# Patient Record
Sex: MALE | Race: BLACK OR AFRICAN AMERICAN | Employment: UNEMPLOYED | RURAL
[De-identification: names, ages, dates, MRNs, and addresses within clinical notes are randomized per-mention and may not be internally consistent; named-entity substitution may affect disease eponyms.]

---

## 2017-11-28 ENCOUNTER — OFFICE VISIT (OUTPATIENT)
Dept: FAMILY MEDICINE CLINIC | Age: 52
End: 2017-11-28

## 2017-11-28 ENCOUNTER — DOCUMENTATION ONLY (OUTPATIENT)
Dept: FAMILY MEDICINE CLINIC | Age: 52
End: 2017-11-28

## 2017-11-28 VITALS
RESPIRATION RATE: 18 BRPM | BODY MASS INDEX: 27.59 KG/M2 | HEIGHT: 73 IN | OXYGEN SATURATION: 99 % | SYSTOLIC BLOOD PRESSURE: 147 MMHG | WEIGHT: 208.2 LBS | TEMPERATURE: 98.3 F | HEART RATE: 84 BPM | DIASTOLIC BLOOD PRESSURE: 97 MMHG

## 2017-11-28 DIAGNOSIS — I10 ESSENTIAL HYPERTENSION: Primary | ICD-10-CM

## 2017-11-28 DIAGNOSIS — E55.9 HYPOVITAMINOSIS D: ICD-10-CM

## 2017-11-28 DIAGNOSIS — N52.02 CORPORO-VENOUS OCCLUSIVE ERECTILE DYSFUNCTION: ICD-10-CM

## 2017-11-28 DIAGNOSIS — G47.33 OBSTRUCTIVE SLEEP APNEA SYNDROME: ICD-10-CM

## 2017-11-28 DIAGNOSIS — M75.01 ADHESIVE CAPSULITIS OF RIGHT SHOULDER: ICD-10-CM

## 2017-11-28 DIAGNOSIS — S46.011A STRAIN OF ROTATOR CUFF, RIGHT, INITIAL ENCOUNTER: ICD-10-CM

## 2017-11-28 DIAGNOSIS — F43.10 PTSD (POST-TRAUMATIC STRESS DISORDER): ICD-10-CM

## 2017-11-28 DIAGNOSIS — J30.89 CHRONIC NONSEASONAL ALLERGIC RHINITIS DUE TO FUNGAL SPORES: ICD-10-CM

## 2017-11-28 DIAGNOSIS — E11.42 CONTROLLED TYPE 2 DIABETES MELLITUS WITH DIABETIC POLYNEUROPATHY, WITHOUT LONG-TERM CURRENT USE OF INSULIN (HCC): ICD-10-CM

## 2017-11-28 RX ORDER — HYDROCHLOROTHIAZIDE 25 MG/1
25 TABLET ORAL DAILY
Qty: 90 TAB | Refills: 1 | Status: SHIPPED | OUTPATIENT
Start: 2017-11-28 | End: 2018-02-28 | Stop reason: SDUPTHER

## 2017-11-28 RX ORDER — FLUTICASONE PROPIONATE 50 MCG
SPRAY, SUSPENSION (ML) NASAL
Qty: 1 BOTTLE | Refills: 12 | Status: SHIPPED | OUTPATIENT
Start: 2017-11-28 | End: 2021-01-27

## 2017-11-28 RX ORDER — IPRATROPIUM BROMIDE 42 UG/1
1 SPRAY, METERED NASAL 4 TIMES DAILY
Qty: 15 ML | Refills: 0 | Status: SHIPPED | OUTPATIENT
Start: 2017-11-28 | End: 2019-02-13

## 2017-11-28 NOTE — MR AVS SNAPSHOT
Visit Information Date & Time Provider Department Dept. Phone Encounter #  
 11/28/2017  7:00 AM MD [facility] [phone] [id number] Your Appointments 2/28/2018  7:00 AM  
Follow Up with MD [facility]) Appt Note: 3 month f/u  
 [address] [facility] [address] [address] [phone]  
  
   
 [address] [place] [id number] Upcoming Health Maintenance Date Due Hepatitis C Screening 1965 COLONOSCOPY 11/30/2025 DTaP/Tdap/Td series (2 - Td) 11/28/2027 Allergies as of 11/28/2017  Review Complete On: 11/28/2017 By: Aisha Clifford MD  
 No Known Allergies Current Immunizations  Never Reviewed No immunizations on file. Not reviewed this visit You Were Diagnosed With   
  
 Codes Comments Essential hypertension    -  Primary ICD-10-CM: I10 
ICD-9-CM: 401.9 Corporo-venous occlusive erectile dysfunction     ICD-10-CM: N52.02 
ICD-9-CM: 607.84 Hypovitaminosis D     ICD-10-CM: E55.9 ICD-9-CM: 268.9 Obstructive sleep apnea syndrome     ICD-10-CM: G47.33 
ICD-9-CM: 327.23 Controlled type 2 diabetes mellitus with diabetic polyneuropathy, without long-term current use of insulin (HCC)     ICD-10-CM: E11.42 
ICD-9-CM: 250.60, 357.2 Strain of rotator cuff, right, initial encounter     ICD-10-CM: O78.751C ICD-9-CM: 840.4 Adhesive capsulitis of right shoulder     ICD-10-CM: M75.01 
ICD-9-CM: 726.0 PTSD (post-traumatic stress disorder)     ICD-10-CM: F43.10 ICD-9-CM: 309.81 Chronic nonseasonal allergic rhinitis due to fungal spores     ICD-10-CM: J30.89 ICD-9-CM: 477.8 Vitals BP Pulse Temp Resp Height(growth percentile) Weight(growth percentile) (!) 147/97 (BP 1 Location: Left arm, BP Patient Position: Sitting) 84 98.3 °F (36.8 °C) (Oral) 18 6' 1\" (1.854 m) 208 lb 3.2 oz (94.4 kg) SpO2 BMI Smoking Status 99% 27.47 kg/m2 Current Every Day Smoker Vitals History BMI and BSA Data Body Mass Index Body Surface Area  
 27.47 kg/m 2 2.21 m 2 Preferred Pharmacy Pharmacy Name Phone [address], [place] Cast[place] [phone] Your Updated Medication List  
  
   
This list is accurate as of: 11/28/17  8:09 AM.  Always use your most recent med list.  
  
  
  
  
 capsaicin 0.075 % topical cream  
Apply  to affected area three (3) times daily. diclofenac EC 50 mg EC tablet Commonly known as:  VOLTAREN Take  by mouth two (2) times a day. ferrous sulfate 325 mg (65 mg iron) tablet Take  by mouth Daily (before breakfast). fluticasone 50 mcg/actuation nasal spray Commonly known as:  FLONASE  
1 spray each nostril twice daily  Indications: CHRONIC NON-ALLERGIC RHINITIS  
  
 gabapentin 600 mg tablet Commonly known as:  NEURONTIN Take 1 Tab by mouth three (3) times daily. hydroCHLOROthiazide 25 mg tablet Commonly known as:  HYDRODIURIL Take 1 Tab by mouth daily. ipratropium 0.06 % nasal spray Commonly known as:  ATROVENT  
1 [place] by Both Nostrils route four (4) times daily. Indications: Rhinorrhea  
  
 lancets 23 gauge Misc  
by Does Not Apply route. metFORMIN 500 mg tablet Commonly known as:  GLUCOPHAGE Take 2 Tabs by mouth two (2) times daily (with meals). methyl salicylate-menthol 00-54 % topical cream  
Commonly known as:  [place] Apply  to affected area three (3) times daily. omeprazole 20 mg capsule Commonly known as:  PRILOSEC Take 20 mg by mouth daily. ondansetron 4 mg disintegrating tablet Commonly known as:  ZOFRAN ODT Take 1 Tab by mouth every eight (8) hours as needed for Nausea for up to 12 doses. prazosin 2 mg capsule Commonly known as:  MINIPRESS Take 2 mg by mouth nightly. promethazine 25 mg tablet Commonly known as:  PHENERGAN Take 1 Tab by mouth every six (6) hours as needed. sertraline 50 mg tablet Commonly known as:  ZOLOFT Take 1 Tab by mouth daily. VITAMIN D2 50,000 unit capsule Generic drug:  ergocalciferol Take 50,000 Units by mouth every Monday. Prescriptions Printed Refills  
 hydroCHLOROthiazide (HYDRODIURIL) 25 mg tablet 1 Sig: Take 1 Tab by mouth daily. Class: Print Route: Oral  
 fluticasone (FLONASE) 50 mcg/actuation nasal spray 12 Si spray each nostril twice daily  Indications: CHRONIC NON-ALLERGIC RHINITIS Class: Print  
 ipratropium (ATROVENT) 0.06 % nasal spray 0 [place] by Both Nostrils route four (4) times daily. Indications: Rhinorrhea Class: Print Route: Both Nostrils We Performed the Following CBC WITH AUTOMATED DIFF [88348 CPT(R)] HEMOGLOBIN A1C WITH EAG [51570 CPT(R)] LIPID PANEL [43327 CPT(R)] METABOLIC PANEL, COMPREHENSIVE [65486 CPT(R)] MICROALBUMIN, UR, RAND W/ MICROALBUMIN/CREA RATIO B5110220 CPT(R)] PROLACTIN [53575 CPT(R)] REFERRAL TO PHYSICAL THERAPY [XYB42 Custom] TESTOSTERONE, FREE & TOTAL [39147 CPT(R)] VITAMIN D, 1, 25 DIHYDROXY [14337 CPT(R)] Referral Information Referral ID Referred By Referred To  
  
 [id number] [address] [place] [address] [address] [facility], [facility] Phone: [phone] Visits Status Start Date End Date 1 New Request 17 If your referral has a status of pending review or denied, additional information will be sent to support the outcome of this decision. Introducing [facility]! Malena Guerrero introduces [facility] patient portal. Now you can access parts of your medical record, email your doctor's office, and request medication refills online. 1. In your internet browser, go to [email or web address] 2. Click on the First Time User? Click Here link in the Sign In box. You will see the New Member Sign Up page. 3. Enter your [employer] Access Code exactly as it appears below. You will not need to use this code after youve completed the sign-up process. If you do not sign up before the expiration date, you must request a new code. · [employer] Access Code: [email or web address] 
Expires: 12/26/2017  8:45 AM 
 
4. Enter the last four digits of your Social Security Number (xxxx) and Date of Birth (mm/dd/yyyy) as indicated and click Submit. You will be taken to the next sign-up page. 5. Create a [employer] ID. This will be your [employer] login ID and cannot be changed, so think of one that is secure and easy to remember. 6. Create a [employer] password. You can change your password at any time. 7. Enter your Password Reset Question and Answer. This can be used at a later time if you forget your password. 8. Enter your e-mail address. You will receive e-mail notification when new information is available in [address] 9. Click Sign Up. You can now view and download portions of your medical record. 10. Click the Download Summary menu link to download a portable copy of your medical information. If you have questions, please visit the Frequently Asked Questions section of the [employer] website. Remember, [employer] is NOT to be used for urgent needs. For medical emergencies, dial 911. Now available from your iPhone and Android! Please provide this summary of care documentation to your next provider. Your primary care clinician is listed as Carol Wolfe. If you have any questions after today's visit, please call [phone].

## 2017-11-29 LAB
1,25(OH)2D3 SERPL-MCNC: 36.5 PG/ML (ref 19.9–79.3)
ALBUMIN SERPL-MCNC: 4.1 G/DL (ref 3.5–5.5)
ALBUMIN/CREAT UR: 4.4 MG/G CREAT (ref 0–30)
ALBUMIN/GLOB SERPL: 1.6 {RATIO} (ref 1.2–2.2)
ALP SERPL-CCNC: 61 IU/L (ref 39–117)
ALT SERPL-CCNC: 17 IU/L (ref 0–44)
AST SERPL-CCNC: 14 IU/L (ref 0–40)
BASOPHILS # BLD AUTO: 0 X10E3/UL (ref 0–0.2)
BASOPHILS NFR BLD AUTO: 0 %
BILIRUB SERPL-MCNC: 0.9 MG/DL (ref 0–1.2)
BUN SERPL-MCNC: 13 MG/DL (ref 6–24)
BUN/CREAT SERPL: 15 (ref 9–20)
CALCIUM SERPL-MCNC: 9.5 MG/DL (ref 8.7–10.2)
CHLORIDE SERPL-SCNC: 100 MMOL/L (ref 96–106)
CHOLEST SERPL-MCNC: 245 MG/DL (ref 100–199)
CO2 SERPL-SCNC: 25 MMOL/L (ref 18–29)
CREAT SERPL-MCNC: 0.84 MG/DL (ref 0.76–1.27)
CREAT UR-MCNC: 104.2 MG/DL
EOSINOPHIL # BLD AUTO: 0.2 X10E3/UL (ref 0–0.4)
EOSINOPHIL NFR BLD AUTO: 3 %
ERYTHROCYTE [DISTWIDTH] IN BLOOD BY AUTOMATED COUNT: 13.5 % (ref 12.3–15.4)
EST. AVERAGE GLUCOSE BLD GHB EST-MCNC: 126 MG/DL
GFR SERPLBLD CREATININE-BSD FMLA CKD-EPI: 101 ML/MIN/1.73
GFR SERPLBLD CREATININE-BSD FMLA CKD-EPI: 116 ML/MIN/1.73
GLOBULIN SER CALC-MCNC: 2.5 G/DL (ref 1.5–4.5)
GLUCOSE SERPL-MCNC: 156 MG/DL (ref 65–99)
HBA1C MFR BLD: 6 % (ref 4.8–5.6)
HCT VFR BLD AUTO: 35.7 % (ref 37.5–51)
HDLC SERPL-MCNC: 66 MG/DL
HGB BLD-MCNC: 11.8 G/DL (ref 12.6–17.7)
IMM GRANULOCYTES # BLD: 0 X10E3/UL (ref 0–0.1)
IMM GRANULOCYTES NFR BLD: 0 %
INTERPRETATION, 910389: NORMAL
LDLC SERPL CALC-MCNC: 157 MG/DL (ref 0–99)
LYMPHOCYTES # BLD AUTO: 2.6 X10E3/UL (ref 0.7–3.1)
LYMPHOCYTES NFR BLD AUTO: 46 %
MCH RBC QN AUTO: 31.7 PG (ref 26.6–33)
MCHC RBC AUTO-ENTMCNC: 33.1 G/DL (ref 31.5–35.7)
MCV RBC AUTO: 96 FL (ref 79–97)
MICROALBUMIN UR-MCNC: 4.6 UG/ML
MONOCYTES # BLD AUTO: 0.5 X10E3/UL (ref 0.1–0.9)
MONOCYTES NFR BLD AUTO: 8 %
NEUTROPHILS # BLD AUTO: 2.5 X10E3/UL (ref 1.4–7)
NEUTROPHILS NFR BLD AUTO: 43 %
PLATELET # BLD AUTO: 201 X10E3/UL (ref 150–379)
POTASSIUM SERPL-SCNC: 4.2 MMOL/L (ref 3.5–5.2)
PROLACTIN SERPL-MCNC: 10.5 NG/ML (ref 4–15.2)
PROT SERPL-MCNC: 6.6 G/DL (ref 6–8.5)
RBC # BLD AUTO: 3.72 X10E6/UL (ref 4.14–5.8)
SODIUM SERPL-SCNC: 140 MMOL/L (ref 134–144)
TESTOST FREE SERPL-MCNC: 9.2 PG/ML (ref 7.2–24)
TESTOST SERPL-MCNC: 406 NG/DL (ref 264–916)
TRIGL SERPL-MCNC: 108 MG/DL (ref 0–149)
VLDLC SERPL CALC-MCNC: 22 MG/DL (ref 5–40)
WBC # BLD AUTO: 5.8 X10E3/UL (ref 3.4–10.8)

## 2017-12-01 RX ORDER — ATORVASTATIN CALCIUM 40 MG/1
40 TABLET, FILM COATED ORAL DAILY
Qty: 90 TAB | Refills: 1 | Status: SHIPPED | OUTPATIENT
Start: 2017-12-01 | End: 2021-01-27

## 2018-02-28 ENCOUNTER — DOCUMENTATION ONLY (OUTPATIENT)
Dept: FAMILY MEDICINE CLINIC | Age: 53
End: 2018-02-28

## 2018-02-28 ENCOUNTER — OFFICE VISIT (OUTPATIENT)
Dept: FAMILY MEDICINE CLINIC | Age: 53
End: 2018-02-28

## 2018-02-28 VITALS
HEIGHT: 73 IN | BODY MASS INDEX: 27.51 KG/M2 | RESPIRATION RATE: 18 BRPM | DIASTOLIC BLOOD PRESSURE: 88 MMHG | SYSTOLIC BLOOD PRESSURE: 133 MMHG | OXYGEN SATURATION: 98 % | WEIGHT: 207.6 LBS | HEART RATE: 75 BPM

## 2018-02-28 DIAGNOSIS — Z11.59 ENCOUNTER FOR HEPATITIS C VIRUS SCREENING TEST FOR HIGH RISK PATIENT: ICD-10-CM

## 2018-02-28 DIAGNOSIS — M25.50 ARTHRALGIA, UNSPECIFIED JOINT: ICD-10-CM

## 2018-02-28 DIAGNOSIS — K21.9 GASTROESOPHAGEAL REFLUX DISEASE, ESOPHAGITIS PRESENCE NOT SPECIFIED: ICD-10-CM

## 2018-02-28 DIAGNOSIS — I10 ESSENTIAL HYPERTENSION: ICD-10-CM

## 2018-02-28 DIAGNOSIS — E11.42 CONTROLLED TYPE 2 DIABETES MELLITUS WITH DIABETIC POLYNEUROPATHY, WITHOUT LONG-TERM CURRENT USE OF INSULIN (HCC): Primary | ICD-10-CM

## 2018-02-28 DIAGNOSIS — M75.01 ADHESIVE CAPSULITIS OF RIGHT SHOULDER: ICD-10-CM

## 2018-02-28 DIAGNOSIS — M17.0 PRIMARY OSTEOARTHRITIS OF BOTH KNEES: ICD-10-CM

## 2018-02-28 DIAGNOSIS — Z91.89 ENCOUNTER FOR HEPATITIS C VIRUS SCREENING TEST FOR HIGH RISK PATIENT: ICD-10-CM

## 2018-02-28 RX ORDER — NAPROXEN 500 MG/1
500 TABLET ORAL 2 TIMES DAILY WITH MEALS
Qty: 180 TAB | Refills: 1 | Status: SHIPPED | OUTPATIENT
Start: 2018-02-28 | End: 2018-02-28 | Stop reason: ALTCHOICE

## 2018-02-28 RX ORDER — HYDROCHLOROTHIAZIDE 25 MG/1
25 TABLET ORAL DAILY
Qty: 90 TAB | Refills: 1 | Status: SHIPPED | OUTPATIENT
Start: 2018-02-28 | End: 2021-01-27

## 2018-02-28 RX ORDER — OMEPRAZOLE 40 MG/1
40 CAPSULE, DELAYED RELEASE ORAL DAILY
Qty: 90 CAP | Refills: 1 | Status: SHIPPED | OUTPATIENT
Start: 2018-02-28 | End: 2018-02-28 | Stop reason: ALTCHOICE

## 2018-02-28 RX ORDER — OMEPRAZOLE 40 MG/1
40 CAPSULE, DELAYED RELEASE ORAL DAILY
Qty: 90 CAP | Refills: 1 | Status: SHIPPED | OUTPATIENT
Start: 2018-02-28 | End: 2021-01-27

## 2018-02-28 RX ORDER — MELOXICAM 15 MG/1
15 TABLET ORAL
COMMUNITY
Start: 2018-02-19 | End: 2018-02-28 | Stop reason: ALTCHOICE

## 2018-02-28 RX ORDER — MELOXICAM 15 MG/1
15 TABLET ORAL DAILY
Qty: 90 TAB | Refills: 1 | Status: CANCELLED | OUTPATIENT
Start: 2018-02-28 | End: 2018-04-29

## 2018-02-28 RX ORDER — NAPROXEN 500 MG/1
500 TABLET ORAL 2 TIMES DAILY WITH MEALS
Qty: 180 TAB | Refills: 1 | Status: SHIPPED | OUTPATIENT
Start: 2018-02-28 | End: 2018-03-07 | Stop reason: SDUPTHER

## 2018-02-28 RX ORDER — GABAPENTIN 600 MG/1
600 TABLET ORAL 3 TIMES DAILY
Qty: 270 TAB | Refills: 1 | Status: ON HOLD | OUTPATIENT
Start: 2018-02-28 | End: 2019-02-13 | Stop reason: SDUPTHER

## 2018-02-28 NOTE — PROGRESS NOTES
Rianna Diez is a 46 y.o. male who presents with the following:  Chief Complaint   Patient presents with    Hypertension    Diabetes    Cholesterol Problem       Hypertension    The history is provided by the patient (Patient is having no shortness of breath no chest pain or edema). Pertinent negatives include no chest pain, no blurred vision, no headaches, no dizziness, no shortness of breath, no nausea and no vomiting. Diabetes   The history is provided by the patient (Patient is having some burning pain in his feet which is being reduced by his gabapentin which he only takes twice a day. ). Pertinent negatives include no chest pain, no abdominal pain, no headaches and no shortness of breath. Cholesterol Problem   The history is provided by the patient (Patient is having no muscle pain or weakness from his atorvastatin but he did eat this morning. ). Pertinent negatives include no chest pain, no abdominal pain, no headaches and no shortness of breath. No Known Allergies    Current Outpatient Prescriptions   Medication Sig    gabapentin (NEURONTIN) 600 mg tablet Take 1 Tab by mouth three (3) times daily.  hydroCHLOROthiazide (HYDRODIURIL) 25 mg tablet Take 1 Tab by mouth daily.  naproxen (NAPROSYN) 500 mg tablet Take 1 Tab by mouth two (2) times daily (with meals).  omeprazole (PRILOSEC) 40 mg capsule Take 1 Cap by mouth daily.  polyethylene glycol (MIRALAX) 17 gram/dose powder Take 17 g by mouth daily.  atorvastatin (LIPITOR) 40 mg tablet Take 1 Tab by mouth daily.  fluticasone (FLONASE) 50 mcg/actuation nasal spray 1 spray each nostril twice daily  Indications: CHRONIC NON-ALLERGIC RHINITIS    ipratropium (ATROVENT) 0.06 % nasal spray 1 [place] by Both Nostrils route four (4) times daily. Indications: Rhinorrhea    promethazine (PHENERGAN) 25 mg tablet Take 1 Tab by mouth every six (6) hours as needed.     ondansetron (ZOFRAN ODT) 4 mg disintegrating tablet Take 1 Tab by mouth every eight (8) hours as needed for Nausea for up to 12 doses.  ergocalciferol (VITAMIN D2) 50,000 unit capsule Take 50,000 Units by mouth every Monday.  metFORMIN (GLUCOPHAGE) 500 mg tablet Take 2 Tabs by mouth two (2) times daily (with meals).  sertraline (ZOLOFT) 50 mg tablet Take 1 Tab by mouth daily.  capsaicin 0.075 % topical cream Apply  to affected area three (3) times daily.  lancets 23 gauge misc by Does Not Apply route.  methyl salicylate-menthol (BENGAY) 15-10 % topical cream Apply  to affected area three (3) times daily.  ferrous sulfate 325 mg (65 mg iron) tablet Take  by mouth Daily (before breakfast).  prazosin (MINIPRESS) 2 mg capsule Take 2 mg by mouth nightly. No current facility-administered medications for this visit. Past Medical History:   Diagnosis Date    Anxiety     Back problem     chronic LBP    CAD (coronary artery disease)     pt reports MI x2, hosp 2014, 2015    Depression     Diabetes ([facility] [address]     Glucose intolerance (impaired glucose tolerance)     Hypertension     Joint pain     diffuse    Sleeping difficulties     Weakness     LE>UE       Past Surgical History:   Procedure Laterality Date    HX HEART CATHETERIZATION  2012    [address] [facility]       No family history on file. Social History     Social History    Marital status:      Spouse name: N/A    Number of children: N/A    Years of education: N/A     Social History Main Topics    Smoking status: Former Smoker     Packs/day: 1.00    Smokeless tobacco: Never Used    Alcohol use 25.2 oz/week     42 Cans of beer per week      Comment: beer  6 pack/day ,whiskey 10 per week for 35 years    Drug use: Yes     Special: Marijuana      Comment: couple times week    Sexual activity: Yes     Other Topics Concern    None     Social History Narrative       Review of Systems   Constitutional: Negative for chills, fever and weight loss.    HENT: Negative for congestion and hearing loss. Eyes: Negative for blurred vision and double vision. Respiratory: Negative for shortness of breath and wheezing. Cardiovascular: Negative for chest pain. Gastrointestinal: Positive for heartburn. Negative for abdominal pain, diarrhea, nausea and vomiting. Genitourinary: Negative for dysuria, frequency and urgency. Musculoskeletal: Positive for joint pain. Negative for myalgias. Skin: Negative for itching and rash. Neurological: Positive for tingling (In the feet). Negative for dizziness, sensory change, speech change and headaches. Endo/Heme/Allergies: Negative for environmental allergies. Does not bruise/bleed easily. Psychiatric/Behavioral: Positive for substance abuse (Cocaine and marijuana). Negative for depression, hallucinations and memory loss. The patient is not nervous/anxious and does not have insomnia. Visit Vitals    /88 (BP 1 Location: Left arm, BP Patient Position: Sitting)    Pulse 75    Resp 18    Ht 6' 1\" (1.854 m)    Wt 207 lb 9.6 oz (94.2 kg)    SpO2 98%    BMI 27.39 kg/m2     Physical Exam   Constitutional: He is oriented to person, place, and time and well-developed, well-nourished, and in no distress. HENT:   Head: Normocephalic and atraumatic. Nose: Nose normal.   Mouth/Throat: Oropharynx is clear and moist.   Eyes: Conjunctivae and EOM are normal. Pupils are equal, round, and reactive to light. Neck: Normal range of motion. Neck supple. No JVD present. No tracheal deviation present. No thyromegaly present. Cardiovascular: Normal rate, regular rhythm, normal heart sounds and intact distal pulses. Exam reveals no gallop and no friction rub. No murmur heard. Pulmonary/Chest: Effort normal and breath sounds normal. No respiratory distress. He has no wheezes. He has no rales. He exhibits no tenderness. Abdominal: Soft. Bowel sounds are normal. He exhibits no distension and no mass. There is no tenderness.  There is no rebound and no guarding. Musculoskeletal: Normal range of motion. He exhibits no edema or tenderness. Lymphadenopathy:     He has no cervical adenopathy. Neurological: He is alert and oriented to person, place, and time. He has normal reflexes. No cranial nerve deficit. He exhibits normal muscle tone. Gait normal. Coordination normal.   The bottom of the patient's feet are numb although he can feels some monofilament on the dorsum of the feet   Skin: Skin is warm and dry. No rash noted. No erythema. Psychiatric: Mood, memory, affect and judgment normal.   Vitals reviewed. ICD-10-CM ICD-9-CM    1. Controlled type 2 diabetes mellitus with diabetic polyneuropathy, without long-term current use of insulin (HCC) E11.42 250.60 gabapentin (NEURONTIN) 600 mg tablet     357.2 HM DIABETES FOOT EXAM      COLLECTION VENOUS BLOOD,VENIPUNCTURE      MICROALBUMIN, UR, RAND      HEMOGLOBIN A1C WITH EAG      REFERRAL TO PODIATRY      REFERRAL TO OPHTHALMOLOGY   2. Essential hypertension I10 401.9 hydroCHLOROthiazide (HYDRODIURIL) 25 mg tablet      COLLECTION VENOUS BLOOD,VENIPUNCTURE      METABOLIC PANEL, COMPREHENSIVE      LIPID PANEL   3. Arthralgia, unspecified joint M25.50 719.40 gabapentin (NEURONTIN) 600 mg tablet      XR KNEE RT MAX 2 VWS      naproxen (NAPROSYN) 500 mg tablet      DISCONTINUED: naproxen (NAPROSYN) 500 mg tablet   4. Adhesive capsulitis of right shoulder M75.01 726.0 gabapentin (NEURONTIN) 600 mg tablet      naproxen (NAPROSYN) 500 mg tablet      DISCONTINUED: naproxen (NAPROSYN) 500 mg tablet   5. Gastroesophageal reflux disease, esophagitis presence not specified K21.9 530.81 omeprazole (PRILOSEC) 40 mg capsule      DISCONTINUED: omeprazole (PRILOSEC) 40 mg capsule   6. Primary osteoarthritis of both knees M17.0 715.16 XR KNEE RT MAX 2 VWS      naproxen (NAPROSYN) 500 mg tablet   7.  Encounter for hepatitis C virus screening test for high risk patient Z11.59 V73.89 HEPATITIS C AB    Z91.89 Orders Placed This Encounter    XR KNEE RT MAX 2 VWS     Standing Status:   Future     Standing Expiration Date:   3/28/2019     Order Specific Question:   Reason for Exam     Answer:   Bilateral knee pain    METABOLIC PANEL, COMPREHENSIVE    LIPID PANEL    MICROALBUMIN, UR, RAND    HEMOGLOBIN A1C WITH EAG    HEPATITIS C AB    REFERRAL TO PODIATRY     Referral Priority:   Routine     Referral Type:   Consultation     Referral Reason:   Specialty Services Required    REFERRAL TO OPHTHALMOLOGY     Referral Priority:   Routine     Referral Type:   Consultation     Referral Reason:   Specialty Services Required     DIABETES FOOT EXAM    COLLECTION VENOUS BLOOD,VENIPUNCTURE    gabapentin (NEURONTIN) 600 mg tablet     Sig: Take 1 Tab by mouth three (3) times daily. Dispense:  270 Tab     Refill:  1    hydroCHLOROthiazide (HYDRODIURIL) 25 mg tablet     Sig: Take 1 Tab by mouth daily. Dispense:  90 Tab     Refill:  1    DISCONTD: naproxen (NAPROSYN) 500 mg tablet     Sig: Take 1 Tab by mouth two (2) times daily (with meals). Dispense:  180 Tab     Refill:  1    DISCONTD: omeprazole (PRILOSEC) 40 mg capsule     Sig: Take 1 Cap by mouth daily. Indications: PREVENTION OF NSAID-INDUCED GASTRIC ULCER     Dispense:  90 Cap     Refill:  1    naproxen (NAPROSYN) 500 mg tablet     Sig: Take 1 Tab by mouth two (2) times daily (with meals). Dispense:  180 Tab     Refill:  1    omeprazole (PRILOSEC) 40 mg capsule     Sig: Take 1 Cap by mouth daily. Dispense:  90 Cap     Refill:  1   Diabetic foot exam was carried out demonstrating hammertoes and that he was numb under both feet tomorrow filament exam and he has fungus under all of his toes but his pulsations were good in the dorsalis pedis and posterior tibials bilaterally.   The patient was told to get Lamisil cream over-the-counter and use it daily on his toes and under the toes and to clean the debris out gently to avoid any injury.     Follow-up Disposition: Not on Nathan Euceda MD

## 2018-02-28 NOTE — MR AVS SNAPSHOT
[address] [phone] Patient: Elizabeth Carter MRN: [id number] :1965 Visit Information Date & Time Provider Department Dept. Phone Encounter #  
 2018  7:00 AM Corrinne Round., MD [address] [place] [address] [id number] Upcoming Health Maintenance Date Due  
 EYE EXAM RETINAL OR DILATED Q1 1975 HEMOGLOBIN A1C Q6M 2018 MICROALBUMIN Q1 2018 LIPID PANEL Q1 2018 FOOT EXAM Q1 2019 COLONOSCOPY 2025 DTaP/Tdap/Td series (2 - Td) 2027 Allergies as of 2018  Review Complete On: 2018 By: Corrinne Round., MD  
 No Known Allergies Current Immunizations  Never Reviewed No immunizations on file. Not reviewed this visit You Were Diagnosed With   
  
 Codes Comments Controlled type 2 diabetes mellitus with diabetic polyneuropathy, without long-term current use of insulin (HCC)    -  Primary ICD-10-CM: E11.42 
ICD-9-CM: 250.60, 357.2 Essential hypertension     ICD-10-CM: I10 
ICD-9-CM: 401.9 Arthralgia, unspecified joint     ICD-10-CM: M25.50 ICD-9-CM: 719.40 Adhesive capsulitis of right shoulder     ICD-10-CM: M75.01 
ICD-9-CM: 726.0 Gastroesophageal reflux disease, esophagitis presence not specified     ICD-10-CM: K21.9 ICD-9-CM: 530.81 Primary osteoarthritis of both knees     ICD-10-CM: M17.0 ICD-9-CM: 715.16 Vitals BP Pulse Resp Height(growth percentile) Weight(growth percentile) SpO2  
 133/88 (BP 1 Location: Left arm, BP Patient Position: Sitting) 75 18 6' 1\" (1.854 m) 207 lb 9.6 oz (94.2 kg) 98% BMI Smoking Status 27.39 kg/m2 Former Smoker Vitals History BMI and BSA Data Body Mass Index Body Surface Area  
 27.39 kg/m 2 2.2 m 2 Preferred Pharmacy Pharmacy Name Phone [facility],[address] [place] [address], [address] [place] [address] [address] [place] [address] [place] [phone] Your Updated Medication List  
  
   
This list is accurate as of 2/28/18  7:54 AM.  Always use your most recent med list.  
  
  
  
  
 atorvastatin 40 mg tablet Commonly known as:  LIPITOR Take 1 Tab by mouth daily. capsaicin 0.075 % topical cream  
Apply  to affected area three (3) times daily. diclofenac EC 50 mg EC tablet Commonly known as:  VOLTAREN Take  by mouth two (2) times a day. ferrous sulfate 325 mg (65 mg iron) tablet Take  by mouth Daily (before breakfast). fluticasone 50 mcg/actuation nasal spray Commonly known as:  FLONASE  
1 spray each nostril twice daily  Indications: CHRONIC NON-ALLERGIC RHINITIS  
  
 gabapentin 600 mg tablet Commonly known as:  NEURONTIN Take 1 Tab by mouth three (3) times daily. hydroCHLOROthiazide 25 mg tablet Commonly known as:  HYDRODIURIL Take 1 Tab by mouth daily. ipratropium 42 mcg (0.06 %) nasal spray Commonly known as:  ATROVENT  
1 [place] by Both Nostrils route four (4) times daily. Indications: Rhinorrhea  
  
 lancets 23 gauge Misc  
by Does Not Apply route. metFORMIN 500 mg tablet Commonly known as:  GLUCOPHAGE Take 2 Tabs by mouth two (2) times daily (with meals). methyl salicylate-menthol 08-66 % topical cream  
Commonly known as:  Sander Brill Apply  to affected area three (3) times daily. naproxen 500 mg tablet Commonly known as:  NAPROSYN Take 1 Tab by mouth two (2) times daily (with meals). omeprazole 40 mg capsule Commonly known as:  PRILOSEC Take 1 Cap by mouth daily. Indications: PREVENTION OF NSAID-INDUCED GASTRIC ULCER  
  
 ondansetron 4 mg disintegrating tablet Commonly known as:  ZOFRAN ODT Take 1 Tab by mouth every eight (8) hours as needed for Nausea for up to 12 doses. polyethylene glycol 17 gram/dose powder Commonly known as:  Felicia Furlong Take 17 g by mouth daily. prazosin 2 mg capsule Commonly known as:  MINIPRESS Take 2 mg by mouth nightly. promethazine 25 mg tablet Commonly known as:  PHENERGAN Take 1 Tab by mouth every six (6) hours as needed. sertraline 50 mg tablet Commonly known as:  ZOLOFT Take 1 Tab by mouth daily. VITAMIN D2 50,000 unit capsule Generic drug:  ergocalciferol Take 50,000 Units by mouth every Monday. Prescriptions Printed Refills  
 gabapentin (NEURONTIN) 600 mg tablet 1 Sig: Take 1 Tab by mouth three (3) times daily. Class: Print Route: Oral  
 hydroCHLOROthiazide (HYDRODIURIL) 25 mg tablet 1 Sig: Take 1 Tab by mouth daily. Class: Print Route: Oral  
  
Prescriptions Sent to Pharmacy Refills  
 naproxen (NAPROSYN) 500 mg tablet 1 Sig: Take 1 Tab by mouth two (2) times daily (with meals). Class: Normal  
 Pharmacy: [address] [place], [place] [id number] [facility] Ph #: [phone] Route: Oral  
 omeprazole (PRILOSEC) 40 mg capsule 1 Sig: Take 1 Cap by mouth daily. Indications: PREVENTION OF NSAID-INDUCED GASTRIC ULCER Class: Normal  
 Pharmacy: [address] [place], [place] [address] [facility] Ph #: [phone] Route: Oral  
  
We Performed the Following COLLECTION VENOUS BLOOD,VENIPUNCTURE E7601600 CPT(R)] HEMOGLOBIN A1C WITH EAG [93323 CPT(R)]  DIABETES FOOT EXAM [HM7 Custom] LIPID PANEL [22258 CPT(R)] METABOLIC PANEL, COMPREHENSIVE [41563 CPT(R)] MICROALBUMIN, UR, RAND [id number] CPT(R)] REFERRAL TO OPHTHALMOLOGY [REF57 Custom] REFERRAL TO PODIATRY [REF90 Custom] To-Do List   
 03/28/2018 Imaging:  XR KNEE RT MAX 2 VWS Referral Information Referral ID Referred By Referred To  
  
 [id number] Jordan Ordonez Not Available Visits Status Start Date End Date 1 New Request 2/28/18 2/28/19  If your referral has a status of pending review or denied, additional information will be sent to support the outcome of this decision. Referral ID Referred By Referred To  
 [id number] Aloha Buerger Not Available Visits Status Start Date End Date 1 New Request 2/28/18 2/28/19 If your referral has a status of pending review or denied, additional information will be sent to support the outcome of this decision. Introducing [facility] & HEALTH SERVICES! Blaise Scott introduces DB3 Mobile patient portal. Now you can access parts of your medical record, email your doctor's office, and request medication refills online. 1. In your internet browser, go to [email or web address] Distribution 2. Click on the First Time User? Click Here link in the Sign In box. You will see the New Member Sign Up page. 3. Enter your DB3 Mobile Access Code exactly as it appears below. You will not need to use this code after youve completed the sign-up process. If you do not sign up before the expiration date, you must request a new code. · DB3 Mobile Access Code: [email or web address] Expires: 4/9/2018  9:42 AM 
 
4. Enter the last four digits of your Social Security Number (xxxx) and Date of Birth (mm/dd/yyyy) as indicated and click Submit. You will be taken to the next sign-up page. 5. Create a DB3 Mobile ID. This will be your DB3 Mobile login ID and cannot be changed, so think of one that is secure and easy to remember. 6. Create a DB3 Mobile password. You can change your password at any time. 7. Enter your Password Reset Question and Answer. This can be used at a later time if you forget your password. 8. Enter your e-mail address. You will receive e-mail notification when new information is available in [address] 9. Click Sign Up. You can now view and download portions of your medical record. 10. Click the Download Summary menu link to download a portable copy of your medical information.  
 
If you have questions, please visit the Frequently Asked Questions section of the Casmul. Remember, Takipihart is NOT to be used for urgent needs. For medical emergencies, dial 911. Now available from your iPhone and Android! Please provide this summary of care documentation to your next provider. Your primary care clinician is listed as Susan De La Garza. If you have any questions after today's visit, please call [phone].

## 2018-03-01 LAB
ALBUMIN SERPL-MCNC: 4.6 G/DL (ref 3.5–5.5)
ALBUMIN/GLOB SERPL: 1.8 {RATIO} (ref 1.2–2.2)
ALP SERPL-CCNC: 51 IU/L (ref 39–117)
ALT SERPL-CCNC: 20 IU/L (ref 0–44)
AST SERPL-CCNC: 15 IU/L (ref 0–40)
BILIRUB SERPL-MCNC: 1.2 MG/DL (ref 0–1.2)
BUN SERPL-MCNC: 16 MG/DL (ref 6–24)
BUN/CREAT SERPL: 14 (ref 9–20)
CALCIUM SERPL-MCNC: 9.8 MG/DL (ref 8.7–10.2)
CHLORIDE SERPL-SCNC: 95 MMOL/L (ref 96–106)
CHOLEST SERPL-MCNC: 239 MG/DL (ref 100–199)
CO2 SERPL-SCNC: 25 MMOL/L (ref 18–29)
CREAT SERPL-MCNC: 1.13 MG/DL (ref 0.76–1.27)
EST. AVERAGE GLUCOSE BLD GHB EST-MCNC: 111 MG/DL
GFR SERPLBLD CREATININE-BSD FMLA CKD-EPI: 74 ML/MIN/1.73
GFR SERPLBLD CREATININE-BSD FMLA CKD-EPI: 86 ML/MIN/1.73
GLOBULIN SER CALC-MCNC: 2.5 G/DL (ref 1.5–4.5)
GLUCOSE SERPL-MCNC: 201 MG/DL (ref 65–99)
HBA1C MFR BLD: 5.5 % (ref 4.8–5.6)
HCV AB S/CO SERPL IA: <0.1 S/CO RATIO (ref 0–0.9)
HDLC SERPL-MCNC: 47 MG/DL
INTERPRETATION, 910389: NORMAL
LDLC SERPL CALC-MCNC: 160 MG/DL (ref 0–99)
MICROALBUMIN UR-MCNC: 30.6 UG/ML
POTASSIUM SERPL-SCNC: 3.8 MMOL/L (ref 3.5–5.2)
PROT SERPL-MCNC: 7.1 G/DL (ref 6–8.5)
SODIUM SERPL-SCNC: 138 MMOL/L (ref 134–144)
TRIGL SERPL-MCNC: 159 MG/DL (ref 0–149)
VLDLC SERPL CALC-MCNC: 32 MG/DL (ref 5–40)

## 2018-03-07 ENCOUNTER — OFFICE VISIT (OUTPATIENT)
Dept: FAMILY MEDICINE CLINIC | Age: 53
End: 2018-03-07

## 2018-03-07 VITALS
DIASTOLIC BLOOD PRESSURE: 76 MMHG | WEIGHT: 224.6 LBS | HEART RATE: 84 BPM | RESPIRATION RATE: 18 BRPM | BODY MASS INDEX: 29.77 KG/M2 | OXYGEN SATURATION: 99 % | SYSTOLIC BLOOD PRESSURE: 110 MMHG | HEIGHT: 73 IN

## 2018-03-07 DIAGNOSIS — M17.0 PRIMARY OSTEOARTHRITIS OF BOTH KNEES: ICD-10-CM

## 2018-03-07 DIAGNOSIS — M25.50 ARTHRALGIA, UNSPECIFIED JOINT: ICD-10-CM

## 2018-03-07 DIAGNOSIS — M75.01 ADHESIVE CAPSULITIS OF RIGHT SHOULDER: ICD-10-CM

## 2018-03-07 RX ORDER — NAPROXEN 500 MG/1
500 TABLET ORAL 2 TIMES DAILY WITH MEALS
Qty: 60 TAB | Refills: 2 | Status: SHIPPED | OUTPATIENT
Start: 2018-03-07 | End: 2018-03-07 | Stop reason: SDUPTHER

## 2018-03-07 RX ORDER — NAPROXEN 500 MG/1
500 TABLET ORAL 2 TIMES DAILY WITH MEALS
Qty: 60 TAB | Refills: 2 | Status: SHIPPED | OUTPATIENT
Start: 2018-03-07 | End: 2020-08-19

## 2018-03-07 NOTE — MR AVS SNAPSHOT
[address] [place] [address] [phone] Patient: Leeann Carey MRN: [id number] :[id number] Visit Information Date & Time Provider Department Dept. Phone Encounter #  
 3/7/2018  3:20 PM Silke Yepez MD  [place] [id number] Upcoming Health Maintenance Date Due  
 EYE EXAM RETINAL OR DILATED Q1 1975 HEMOGLOBIN A1C Q6M 2018 FOOT EXAM Q1 2019 MICROALBUMIN Q1 2019 LIPID PANEL Q1 2019 COLONOSCOPY 2025 DTaP/Tdap/Td series (2 - Td) 2027 Allergies as of 3/7/2018  Review Complete On: 3/7/2018 By: Silke Yepez MD  
 No Known Allergies Current Immunizations  Never Reviewed No immunizations on file. Not reviewed this visit You Were Diagnosed With   
  
 Codes Comments Arthralgia, unspecified joint     ICD-10-CM: M25.50 ICD-9-CM: 719.40 Adhesive capsulitis of right shoulder     ICD-10-CM: M75.01 
ICD-9-CM: 726.0 Primary osteoarthritis of both knees     ICD-10-CM: M17.0 ICD-9-CM: 715.16 Vitals BP Pulse Resp Height(growth percentile) Weight(growth percentile) SpO2  
 110/76 (BP 1 Location: Left arm, BP Patient Position: Sitting) 84 18 6' 1\" (1.854 m) 224 lb 9.6 oz (101.9 kg) 99% BMI Smoking Status 29.63 kg/m2 Former Smoker Vitals History BMI and BSA Data Body Mass Index Body Surface Area  
 29.63 kg/m 2 2.29 m 2 Preferred Pharmacy Pharmacy Name Phone [address], [address] [phone] Your Updated Medication List  
  
   
This list is accurate as of 3/7/18  4:10 PM.  Always use your most recent med list.  
  
  
  
  
 atorvastatin 40 mg tablet Commonly known as:  LIPITOR Take 1 Tab by mouth daily.   
  
 capsaicin 0.075 % topical cream  
 Apply  to affected area three (3) times daily. ferrous sulfate 325 mg (65 mg iron) tablet Take  by mouth Daily (before breakfast). fluticasone 50 mcg/actuation nasal spray Commonly known as:  FLONASE  
1 spray each nostril twice daily  Indications: CHRONIC NON-ALLERGIC RHINITIS  
  
 gabapentin 600 mg tablet Commonly known as:  NEURONTIN Take 1 Tab by mouth three (3) times daily. hydroCHLOROthiazide 25 mg tablet Commonly known as:  HYDRODIURIL Take 1 Tab by mouth daily. ipratropium 42 mcg (0.06 %) nasal spray Commonly known as:  ATROVENT  
1 [place] by Both Nostrils route four (4) times daily. Indications: Rhinorrhea  
  
 lancets 23 gauge Misc  
by Does Not Apply route. metFORMIN 500 mg tablet Commonly known as:  GLUCOPHAGE Take 2 Tabs by mouth two (2) times daily (with meals). methyl salicylate-menthol 28-95 % topical cream  
Commonly known as:  Corby Likens Apply  to affected area three (3) times daily. naproxen 500 mg tablet Commonly known as:  NAPROSYN Take 1 Tab by mouth two (2) times daily (with meals). omeprazole 40 mg capsule Commonly known as:  PRILOSEC Take 1 Cap by mouth daily. ondansetron 4 mg disintegrating tablet Commonly known as:  ZOFRAN ODT Take 1 Tab by mouth every eight (8) hours as needed for Nausea for up to 12 doses. polyethylene glycol 17 gram/dose powder Commonly known as:  Kingman Ou Take 17 g by mouth daily. prazosin 2 mg capsule Commonly known as:  MINIPRESS Take 2 mg by mouth nightly. promethazine 25 mg tablet Commonly known as:  PHENERGAN Take 1 Tab by mouth every six (6) hours as needed. sertraline 50 mg tablet Commonly known as:  ZOLOFT Take 1 Tab by mouth daily. VITAMIN D2 50,000 unit capsule Generic drug:  ergocalciferol Take 50,000 Units by mouth every Monday. Prescriptions Printed  Refills  
 naproxen (NAPROSYN) 500 mg tablet 2  
 Sig: Take 1 Tab by mouth two (2) times daily (with meals). Class: Print Route: Oral  
  
We Performed the Following REFERRAL TO ORTHOPEDICS [YOV985 Custom] Referral Information Referral ID Referred By Referred To  
  
 [id number] Roman Becker, [address] [place] [address], [place] [address] [facility]  Phone: [phone] Fax: [phone] Visits Status Start Date End Date 1 New Request 3/7/18 3/7/19 If your referral has a status of pending review or denied, additional information will be sent to support the outcome of this decision. Introducing [facility] & HEALTH SERVICES! [facility] introduces Evogen patient portal. Now you can access parts of your medical record, email your doctor's office, and request medication refills online. 1. In your internet browser, go to [email or web address] 2. Click on the First Time User? Click Here link in the Sign In box. You will see the New Member Sign Up page. 3. Enter your Evogen Access Code exactly as it appears below. You will not need to use this code after youve completed the sign-up process. If you do not sign up before the expiration date, you must request a new code. · Evogen Access Code: [email or web address] Expires: 4/9/2018  9:42 AM 
 
4. Enter the last four digits of your Social Security Number (xxxx) and Date of Birth (mm/dd/yyyy) as indicated and click Submit. You will be taken to the next sign-up page. 5. Create a CloudOnt ID. This will be your Evogen login ID and cannot be changed, so think of one that is secure and easy to remember. 6. Create a Evogen password. You can change your password at any time. 7. Enter your Password Reset Question and Answer. This can be used at a later time if you forget your password. 8. Enter your e-mail address. You will receive e-mail notification when new information is available in [address] 9. Click Sign Up. You can now view and download portions of your medical record. 10. Click the Download Summary menu link to download a portable copy of your medical information. If you have questions, please visit the Frequently Asked Questions section of the Sidekick Games website. Remember, Sidekick Games is NOT to be used for urgent needs. For medical emergencies, dial 911. Now available from your iPhone and Android! Please provide this summary of care documentation to your next provider. Your primary care clinician is listed as Keon Phipps. If you have any questions after today's visit, please call [phone].

## 2018-03-07 NOTE — PROGRESS NOTES
Abner Herrera is a 46 y.o. male who presents with the following:  Chief Complaint   Patient presents with    Shoulder Pain     x4 months       Shoulder Pain    The history is provided by the patient (Patient's knees are feeling a bit better but his right shoulder is giving him more pain and the steroid injection into it did not seem to help.). The right shoulder is affected. The pain has been constant (Patient told me that the [address] never sent him his Naprosyn and he needed more that he told the nurse that he did have it but he was not going to use because he wanted narcotics. ) since onset. No Known Allergies    Current Outpatient Prescriptions   Medication Sig    naproxen (NAPROSYN) 500 mg tablet Take 1 Tab by mouth two (2) times daily (with meals).  gabapentin (NEURONTIN) 600 mg tablet Take 1 Tab by mouth three (3) times daily.  hydroCHLOROthiazide (HYDRODIURIL) 25 mg tablet Take 1 Tab by mouth daily.  omeprazole (PRILOSEC) 40 mg capsule Take 1 Cap by mouth daily.  polyethylene glycol (MIRALAX) 17 gram/dose powder Take 17 g by mouth daily.  atorvastatin (LIPITOR) 40 mg tablet Take 1 Tab by mouth daily.  fluticasone (FLONASE) 50 mcg/actuation nasal spray 1 spray each nostril twice daily  Indications: CHRONIC NON-ALLERGIC RHINITIS    ipratropium (ATROVENT) 0.06 % nasal spray 1 [place] by Both Nostrils route four (4) times daily. Indications: Rhinorrhea    promethazine (PHENERGAN) 25 mg tablet Take 1 Tab by mouth every six (6) hours as needed.  ondansetron (ZOFRAN ODT) 4 mg disintegrating tablet Take 1 Tab by mouth every eight (8) hours as needed for Nausea for up to 12 doses.  ergocalciferol (VITAMIN D2) 50,000 unit capsule Take 50,000 Units by mouth every Monday.  metFORMIN (GLUCOPHAGE) 500 mg tablet Take 2 Tabs by mouth two (2) times daily (with meals).  sertraline (ZOLOFT) 50 mg tablet Take 1 Tab by mouth daily.     capsaicin 0.075 % topical cream Apply  to affected area three (3) times daily.  lancets 23 gauge misc by Does Not Apply route.  methyl salicylate-menthol (BENGAY) 15-10 % topical cream Apply  to affected area three (3) times daily.  ferrous sulfate 325 mg (65 mg iron) tablet Take  by mouth Daily (before breakfast).  prazosin (MINIPRESS) 2 mg capsule Take 2 mg by mouth nightly. No current facility-administered medications for this visit. Past Medical History:   Diagnosis Date    Anxiety     Back problem     chronic LBP    CAD (coronary artery disease)     pt reports MI x2, hosp 2014, 2015    Depression     Diabetes ([address]     Glucose intolerance (impaired glucose tolerance)     Hypertension     Joint pain     diffuse    Sleeping difficulties     Weakness     LE>UE       Past Surgical History:   Procedure Laterality Date    HX HEART CATHETERIZATION  2012    [address] [place]       No family history on file. Social History     Social History    Marital status:      Spouse name: N/A    Number of children: N/A    Years of education: N/A     Social History Main Topics    Smoking status: Former Smoker     Packs/day: 1.00    Smokeless tobacco: Never Used    Alcohol use 25.2 oz/week     42 Cans of beer per week      Comment: beer  6 pack/day ,whiskey 10 per week for 35 years    Drug use: Yes     Special: Marijuana      Comment: couple times week    Sexual activity: Yes     Other Topics Concern    None     Social History Narrative       Review of Systems   Constitutional: Negative for malaise/fatigue. Cardiovascular: Negative for chest pain. Gastrointestinal: Negative for abdominal pain. Musculoskeletal: Positive for joint pain (Right shoulder). Negative for myalgias.        Visit Vitals    /76 (BP 1 Location: Left arm, BP Patient Position: Sitting)    Pulse 84    Resp 18    Ht 6' 1\" (1.854 m)    Wt 224 lb 9.6 oz (101.9 kg)    SpO2 99%    BMI 29.63 kg/m2     Physical Exam   Constitutional: He is oriented to person, place, and time and well-developed, well-nourished, and in no distress. HENT:   Head: Normocephalic and atraumatic. Nose: Nose normal.   Mouth/Throat: Oropharynx is clear and moist.   Eyes: Conjunctivae and EOM are normal. Pupils are equal, round, and reactive to light. Neck: Normal range of motion. Neck supple. No JVD present. No tracheal deviation present. No thyromegaly present. Cardiovascular: Normal rate, regular rhythm, normal heart sounds and intact distal pulses. Exam reveals no gallop and no friction rub. No murmur heard. Pulmonary/Chest: Effort normal and breath sounds normal. No respiratory distress. He has no wheezes. He has no rales. He exhibits no tenderness. Abdominal: Soft. Bowel sounds are normal. He exhibits no distension and no mass. There is no tenderness. There is no rebound and no guarding. Musculoskeletal: Normal range of motion. He exhibits no edema or tenderness. Patient's active range of motion was greatly reduced however on palpating the shoulder there was very little or any tenderness. I told the patient he might very well need surgery so we should go back and see the orthopedic surgeon which was Sada zelaya according to the patient and get reevaluated but the patient then told the nurse that he had no intention of going back to the orthopedic surgeon. Lymphadenopathy:     He has no cervical adenopathy. Neurological: He is alert and oriented to person, place, and time. He has normal reflexes. No cranial nerve deficit. He exhibits normal muscle tone. Gait normal. Coordination normal.   Skin: Skin is warm and dry. No rash noted. No erythema. Psychiatric: Mood, memory, affect and judgment normal.   Vitals reviewed. ICD-10-CM ICD-9-CM    1. Arthralgia, unspecified joint M25.50 719.40 naproxen (NAPROSYN) 500 mg tablet      REFERRAL TO ORTHOPEDICS   2.  Adhesive capsulitis of right shoulder M75.01 726.0 naproxen (NAPROSYN) 500 mg tablet      REFERRAL TO ORTHOPEDICS   3. Primary osteoarthritis of both knees M17.0 715.16 naproxen (NAPROSYN) 500 mg tablet      REFERRAL TO ORTHOPEDICS       Orders Placed This Encounter    REFERRAL TO ORTHOPEDICS     Referral Priority:   Routine     Referral Type:   Consultation     Referral Reason:   Specialty Services Required     Referred to Provider:   Teodoro Win MD    naproxen (NAPROSYN) 500 mg tablet     Sig: Take 1 Tab by mouth two (2) times daily (with meals). Dispense:  60 Tab     Refill:  2   The patient seems intent upon going back and forth to the emergency room to get short courses of narcotics and attempting to avoid real therapy by refusing to go to orthopedics and by telling me one thing and then telling my nurse something else making him highly untrustworthy.     Follow-up Disposition: Not on Rika Landaverde MD

## 2019-02-08 PROBLEM — F33.2 MAJOR DEPRESSIVE DISORDER, RECURRENT EPISODE, SEVERE (HCC): Status: ACTIVE | Noted: 2019-02-08

## 2019-02-08 PROBLEM — F32.A DEPRESSIVE DISORDER: Status: ACTIVE | Noted: 2019-02-08

## 2019-02-13 PROBLEM — F10.10 ALCOHOL ABUSE: Status: ACTIVE | Noted: 2019-02-13

## 2019-02-13 PROBLEM — F12.20 CANNABIS DEPENDENCE (HCC): Status: ACTIVE | Noted: 2019-02-13
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
## 2021-05-30 ENCOUNTER — HOSPITAL ENCOUNTER (OUTPATIENT)
Age: 56
Setting detail: OBSERVATION
Discharge: HOME | End: 2021-05-31
Attending: FAMILY MEDICINE | Admitting: FAMILY MEDICINE
Payer: MEDICARE

## 2021-05-30 DIAGNOSIS — I63.9 ACUTE ISCHEMIC STROKE (HCC): Primary | ICD-10-CM

## 2021-05-30 PROCEDURE — 85610 PROTHROMBIN TIME: CPT

## 2021-05-30 PROCEDURE — 80053 COMPREHEN METABOLIC PANEL: CPT

## 2021-05-30 PROCEDURE — 93005 ELECTROCARDIOGRAM TRACING: CPT

## 2021-05-30 PROCEDURE — 99285 EMERGENCY DEPT VISIT HI MDM: CPT

## 2021-05-30 PROCEDURE — 85025 COMPLETE CBC W/AUTO DIFF WBC: CPT

## 2021-05-30 PROCEDURE — 36415 COLL VENOUS BLD VENIPUNCTURE: CPT

## 2021-05-30 NOTE — Clinical Note
Patient Class[de-identified] OBSERVATION [104]   Type of Bed: Telemetry [19]   Cardiac Monitoring Required?: Yes   Reason for Observation: acute stroke   Admitting Diagnosis: Acute stroke due to ischemia [facility] [[id number]]   Admitting Physician: Marco A Juan   Attending Physician: Colby Medrano [[id number]]

## 2021-05-31 ENCOUNTER — APPOINTMENT (OUTPATIENT)
Dept: CT IMAGING | Age: 56
End: 2021-05-31
Attending: FAMILY MEDICINE
Payer: MEDICARE

## 2021-05-31 VITALS
WEIGHT: 200 LBS | BODY MASS INDEX: 26.51 KG/M2 | HEIGHT: 73 IN | TEMPERATURE: 99 F | SYSTOLIC BLOOD PRESSURE: 160 MMHG | DIASTOLIC BLOOD PRESSURE: 95 MMHG | HEART RATE: 69 BPM | OXYGEN SATURATION: 99 % | RESPIRATION RATE: 20 BRPM

## 2021-05-31 PROBLEM — I63.9 ACUTE STROKE DUE TO ISCHEMIA (HCC): Status: ACTIVE | Noted: 2021-05-31

## 2021-05-31 PROBLEM — E78.5 HYPERLIPIDEMIA: Chronic | Status: ACTIVE | Noted: 2021-05-31

## 2021-05-31 LAB
ALBUMIN SERPL-MCNC: 3.7 G/DL (ref 3.5–5)
ALBUMIN/GLOB SERPL: 1.1 {RATIO} (ref 1.1–2.2)
ALP SERPL-CCNC: 82 U/L (ref 45–117)
ALT SERPL-CCNC: 22 U/L (ref 12–78)
ANION GAP SERPL CALC-SCNC: 9 MMOL/L (ref 5–15)
AST SERPL-CCNC: 5 U/L (ref 15–37)
ATRIAL RATE: 80 BPM
BASOPHILS # BLD: 0 K/UL (ref 0–0.1)
BASOPHILS NFR BLD: 0 % (ref 0–1)
BILIRUB SERPL-MCNC: 0.4 MG/DL (ref 0.2–1)
BUN SERPL-MCNC: 23 MG/DL (ref 6–20)
BUN/CREAT SERPL: 19 (ref 12–20)
CALCIUM SERPL-MCNC: 8.7 MG/DL (ref 8.5–10.1)
CALCULATED P AXIS, ECG09: 40 DEGREES
CALCULATED R AXIS, ECG10: 18 DEGREES
CALCULATED T AXIS, ECG11: 22 DEGREES
CHLORIDE SERPL-SCNC: 96 MMOL/L (ref 97–108)
CO2 SERPL-SCNC: 27 MMOL/L (ref 21–32)
CREAT SERPL-MCNC: 1.24 MG/DL (ref 0.7–1.3)
DIAGNOSIS, 93000: NORMAL
DIFFERENTIAL METHOD BLD: ABNORMAL
EOSINOPHIL # BLD: 0.1 K/UL (ref 0–0.4)
EOSINOPHIL NFR BLD: 1 % (ref 0–7)
ERYTHROCYTE [DISTWIDTH] IN BLOOD BY AUTOMATED COUNT: 11.3 % (ref 11.5–14.5)
GLOBULIN SER CALC-MCNC: 3.4 G/DL (ref 2–4)
GLUCOSE BLD STRIP.AUTO-MCNC: 224 MG/DL (ref 65–117)
GLUCOSE SERPL-MCNC: 245 MG/DL (ref 65–100)
HCT VFR BLD AUTO: 36.1 % (ref 36.6–50.3)
HGB BLD-MCNC: 12.8 G/DL (ref 12.1–17)
IMM GRANULOCYTES # BLD AUTO: 0 K/UL (ref 0–0.04)
IMM GRANULOCYTES NFR BLD AUTO: 0 % (ref 0–0.5)
INR PPP: 1 (ref 0.9–1.1)
LYMPHOCYTES # BLD: 3.2 K/UL (ref 0.8–3.5)
LYMPHOCYTES NFR BLD: 34 % (ref 12–49)
MCH RBC QN AUTO: 32.2 PG (ref 26–34)
MCHC RBC AUTO-ENTMCNC: 35.5 G/DL (ref 30–36.5)
MCV RBC AUTO: 90.9 FL (ref 80–99)
MONOCYTES # BLD: 0.9 K/UL (ref 0–1)
MONOCYTES NFR BLD: 9 % (ref 5–13)
NEUTS SEG # BLD: 5 K/UL (ref 1.8–8)
NEUTS SEG NFR BLD: 56 % (ref 32–75)
NRBC # BLD: 0 K/UL (ref 0–0.01)
NRBC BLD-RTO: 0 PER 100 WBC
P-R INTERVAL, ECG05: 140 MS
PLATELET # BLD AUTO: 212 K/UL (ref 150–400)
PMV BLD AUTO: 12.1 FL (ref 8.9–12.9)
POTASSIUM SERPL-SCNC: 4.4 MMOL/L (ref 3.5–5.1)
PROT SERPL-MCNC: 7.1 G/DL (ref 6.4–8.2)
PROTHROMBIN TIME: 10 SEC (ref 9–11.1)
Q-T INTERVAL, ECG07: 370 MS
QRS DURATION, ECG06: 86 MS
QTC CALCULATION (BEZET), ECG08: 426 MS
RBC # BLD AUTO: 3.97 M/UL (ref 4.1–5.7)
SERVICE CMNT-IMP: ABNORMAL
SODIUM SERPL-SCNC: 132 MMOL/L (ref 136–145)
VENTRICULAR RATE, ECG03: 80 BPM
WBC # BLD AUTO: 9.3 K/UL (ref 4.1–11.1)

## 2021-05-31 PROCEDURE — 82962 GLUCOSE BLOOD TEST: CPT

## 2021-05-31 PROCEDURE — 74011250637 HC RX REV CODE- 250/637: Performed by: FAMILY MEDICINE

## 2021-05-31 PROCEDURE — 96372 THER/PROPH/DIAG INJ SC/IM: CPT

## 2021-05-31 PROCEDURE — 99218 HC RM OBSERVATION: CPT

## 2021-05-31 PROCEDURE — G0378 HOSPITAL OBSERVATION PER HR: HCPCS

## 2021-05-31 PROCEDURE — 74011250637 HC RX REV CODE- 250/637: Performed by: INTERNAL MEDICINE

## 2021-05-31 PROCEDURE — 70450 CT HEAD/BRAIN W/O DYE: CPT

## 2021-05-31 PROCEDURE — 74011250636 HC RX REV CODE- 250/636: Performed by: FAMILY MEDICINE

## 2021-05-31 PROCEDURE — 74011250636 HC RX REV CODE- 250/636: Performed by: INTERNAL MEDICINE

## 2021-05-31 RX ORDER — SODIUM CHLORIDE 9 MG/ML
125 INJECTION, SOLUTION INTRAVENOUS CONTINUOUS
Status: DISCONTINUED | OUTPATIENT
Start: 2021-05-31 | End: 2021-05-31 | Stop reason: HOSPADM

## 2021-05-31 RX ORDER — SODIUM CHLORIDE 0.9 % (FLUSH) 0.9 %
5-40 SYRINGE (ML) INJECTION AS NEEDED
Status: DISCONTINUED | OUTPATIENT
Start: 2021-05-31 | End: 2021-05-31 | Stop reason: HOSPADM

## 2021-05-31 RX ORDER — GUAIFENESIN 100 MG/5ML
81 LIQUID (ML) ORAL DAILY
Status: DISCONTINUED | OUTPATIENT
Start: 2021-05-31 | End: 2021-05-31 | Stop reason: HOSPADM

## 2021-05-31 RX ORDER — ACETAMINOPHEN 650 MG/1
650 SUPPOSITORY RECTAL
Status: DISCONTINUED | OUTPATIENT
Start: 2021-05-31 | End: 2021-05-31 | Stop reason: HOSPADM

## 2021-05-31 RX ORDER — SODIUM CHLORIDE 0.9 % (FLUSH) 0.9 %
5-40 SYRINGE (ML) INJECTION EVERY 8 HOURS
Status: DISCONTINUED | OUTPATIENT
Start: 2021-05-31 | End: 2021-05-31 | Stop reason: HOSPADM

## 2021-05-31 RX ORDER — POLYETHYLENE GLYCOL 3350 17 G/17G
17 POWDER, FOR SOLUTION ORAL DAILY PRN
Status: DISCONTINUED | OUTPATIENT
Start: 2021-05-31 | End: 2021-05-31 | Stop reason: HOSPADM

## 2021-05-31 RX ORDER — MELOXICAM 7.5 MG/1
15 TABLET ORAL DAILY
Status: DISCONTINUED | OUTPATIENT
Start: 2021-05-31 | End: 2021-05-31 | Stop reason: HOSPADM

## 2021-05-31 RX ORDER — ACETAMINOPHEN 325 MG/1
650 TABLET ORAL
Status: DISCONTINUED | OUTPATIENT
Start: 2021-05-31 | End: 2021-05-31 | Stop reason: HOSPADM

## 2021-05-31 RX ORDER — ALOGLIPTIN 25 MG/1
25 TABLET, FILM COATED ORAL DAILY
Status: DISCONTINUED | OUTPATIENT
Start: 2021-05-31 | End: 2021-05-31 | Stop reason: HOSPADM

## 2021-05-31 RX ORDER — ATORVASTATIN CALCIUM 40 MG/1
40 TABLET, FILM COATED ORAL DAILY
COMMUNITY

## 2021-05-31 RX ORDER — ENOXAPARIN SODIUM 100 MG/ML
40 INJECTION SUBCUTANEOUS DAILY
Status: DISCONTINUED | OUTPATIENT
Start: 2021-05-31 | End: 2021-05-31 | Stop reason: HOSPADM

## 2021-05-31 RX ORDER — TRAZODONE HYDROCHLORIDE 100 MG/1
100 TABLET ORAL
COMMUNITY

## 2021-05-31 RX ORDER — METOCLOPRAMIDE 5 MG/1
10 TABLET ORAL
Status: DISCONTINUED | OUTPATIENT
Start: 2021-05-31 | End: 2021-05-31 | Stop reason: HOSPADM

## 2021-05-31 RX ORDER — GUAIFENESIN 100 MG/5ML
325 LIQUID (ML) ORAL
Status: COMPLETED | OUTPATIENT
Start: 2021-05-31 | End: 2021-05-31

## 2021-05-31 RX ORDER — FAMOTIDINE 20 MG/1
20 TABLET, FILM COATED ORAL 2 TIMES DAILY
Status: DISCONTINUED | OUTPATIENT
Start: 2021-05-31 | End: 2021-05-31 | Stop reason: HOSPADM

## 2021-05-31 RX ORDER — TRAZODONE HYDROCHLORIDE 100 MG/1
100 TABLET ORAL
Status: DISCONTINUED | OUTPATIENT
Start: 2021-05-31 | End: 2021-05-31 | Stop reason: HOSPADM

## 2021-05-31 RX ADMIN — ALOGLIPTIN 25 MG: 25 TABLET, FILM COATED ORAL at 10:48

## 2021-05-31 RX ADMIN — SODIUM CHLORIDE 125 ML/HR: 9 INJECTION, SOLUTION INTRAVENOUS at 04:36

## 2021-05-31 RX ADMIN — FAMOTIDINE 20 MG: 20 TABLET ORAL at 10:48

## 2021-05-31 RX ADMIN — ASPIRIN 81 MG CHEWABLE TABLET 81 MG: 81 TABLET CHEWABLE at 10:48

## 2021-05-31 RX ADMIN — ASPIRIN 81 MG CHEWABLE TABLET 324 MG: 81 TABLET CHEWABLE at 03:09

## 2021-05-31 RX ADMIN — MELOXICAM 15 MG: 7.5 TABLET ORAL at 10:48

## 2021-05-31 RX ADMIN — ENOXAPARIN SODIUM 40 MG: 40 INJECTION SUBCUTANEOUS at 10:49

## 2021-05-31 NOTE — PROGRESS NOTES
Transition of Care (BELEN) Plan:      Patient admitted and discharged the same day before CM interview and evaluation could be done. Patient's PCP is at the [facility] in [facility] (probably [place]) From the documentation in discharge summary, patient wanted to have all of his f/u testing done at the [facility] in [facility]. Will attempt to contact the patient and f/u on the appointment at [place] and any needs.

## 2021-05-31 NOTE — ED NOTES
Report given to [facility], all questions answered. Patient transported to , condition unchanged. NAD noted when leaving department.

## 2021-05-31 NOTE — PROGRESS NOTES
Discharge instructions reviewed with patient.   Personal belongings returned: JOSE  Home meds returned: JOSE  To front entrance via   Discharged home with  Wife @ [phone]

## 2021-05-31 NOTE — PROGRESS NOTES
Bedside shift change report given to Sandee (oncoming nurse) by George Parks (offgoing nurse). Report included the following information SBAR, Kardex, Intake/Output and MAR.

## 2021-05-31 NOTE — H&P
[facility]   OBS Admission History & Physical        5/31/2021 9:38 AM  Patient: Duane Olivera 1965  PCP: Jeanne Flores MD    HISTORY  Chief Complaint:   Chief Complaint   Patient presents with    Numbness       HPI: 54 y.o. male presenting for admission to [facility] for further evaluation and treatment for Acute stroke due to ischemia [facility]). He  has a past medical history of Anxiety, Arthritis, Back problem, CAD (coronary artery disease), Depression, Diabetes ([address] Glucose intolerance (impaired glucose tolerance), Hypertension, Joint pain, Sleeping difficulties, and Weakness. Filiberto Diehl He reports the presenting symptoms of numbness in L face / UE / LE began acutely last evening 1830 - 6 hrs before presenting. He has chronic bilateral tingle / numbness / pain attributed to diabetic neuropathy for years. He has no feeling in B feet. At about 1830 he noted L facial numbess in a/w L sided headache. He has a h/o intermittent HA, but not migraine. With persistent c/o he presented to the ED 6 hours later for assessment. CT neg for acute findings. Exam was remarkable only for asymetric sence of touch - abnormal feeling to touch in the L face / UE / LE. He ambulates with a walker due to his severe LE neuropathy. He had no speech deficit noted. He came to the ED with his wife. He is followed closely at the [facility], and would like to keep his care there. He requests being discharged today. He notes he can f/u with [facility] OPD walk-in clinic this evening or tomorrow. He has never had similar symptoms as the current. He has been on ASA chronically.       Past Medical History:  Past Medical History:   Diagnosis Date    Anxiety     Arthritis     Back problem     chronic LBP    CAD (coronary artery disease)     pt reports MI x2, hosp 2014, 2015    Depression     Diabetes ([address]     Glucose intolerance (impaired glucose tolerance)     Hypertension     Joint pain     diffuse    Sleeping difficulties     Weakness     LE>UE       Past Surgical History:  Past Surgical History:   Procedure Laterality Date    HX HEART CATHETERIZATION  2012    [facility]       Medication:  Prior to Admission medications    Medication Sig Start Date End Date Taking? Authorizing Provider   traZODone (DESYREL) 100 mg tablet Take 100 mg by mouth nightly. Yes Other, MD Ada   metoclopramide HCl (Reglan) 10 mg tablet Take 1 Tab by mouth every six (6) hours as needed for Nausea or Vomiting. 1/27/21  Yes Drury Cheadle., MD   famotidine (Pepcid) 20 mg tablet Take 1 Tab by mouth two (2) times a day. 1/27/21  Yes Termeer, Velora Shone., MD   alogliptin (NESINA) 25 mg tablet Take 25 mg by mouth daily. Yes Other, MD Ada   aspirin 81 mg chewable tablet Take 81 mg by mouth daily. Yes Other, MD Ada   glucosamine-chondroitin (ARTHX) 500-400 mg cap Take 1 Cap by mouth daily. Yes Other, MD Ada   lisinopriL (PRINIVIL, ZESTRIL) 5 mg tablet Take 2.5 mg by mouth daily. Yes Other, MD Ada   meloxicam (MOBIC) 15 mg tablet Take 15 mg by mouth daily. Other, MD Ada   lancets 23 gauge misc by Does Not Apply route. Provider, Historical       Allergies:  No Known Allergies    Social History:  Social History     Tobacco Use    Smoking status: Former Smoker     Packs/day: 1.00    Smokeless tobacco: Never Used   Substance Use Topics    Alcohol use:  Yes     Alcohol/week: 42.0 standard drinks     Types: 42 Cans of beer per week     Comment: beer  6 pack/day ,whiskey 10 per week for 35 years    Drug use: Yes     Types: Marijuana     Comment: couple times week    Disability - 16yrs service till 2006  Lives with wife who assists with meds    Family History:  F and Brother with DM / Vascular disease    ROS:  Total of 12 systems reviewed as follows:  POSITIVE= bolded text  Negative = text not bolded       General:  fever, chills, sweats, generalized weakness, weight loss/gain, loss of appetite   Eyes: blurred vision, eye pain, loss of vision, double vision  ENT:    rhinorrhea, pharyngitis   Respiratory:  cough, sputum production, SOB, BURR, wheezing, pleuritic pain   Cardiology:   chest pain, palpitations, orthopnea, PND, edema, syncope   Gastrointestinal:  abdominal pain , N/V, diarrhea, dysphagia, constipation, bleeding   Genitourinary:  frequency, urgency, dysuria, hematuria, incontinence, prostatism   Muskuloskeletal: arthralgia, myalgia, back pain  Hematology:   easy bruising, nose or gum bleeding, lymphadenopathy   Dermatological: rash, ulceration, pruritis, color change / jaundice  Endocrine:   hot flashes or polydipsia   Neurological:  headache, dizziness, confusion, focal weakness, paresthesia, speech difficulties, memory loss, gait difficulty  Psychological: feelings of anxiety, depression, agitation      PHYSICAL EXAM:  Patient Vitals for the past 24 hrs:   Temp Pulse Resp BP SpO2   05/31/21 0800 98.2 °F (36.8 °C) 78 20 (!) 158/95    05/31/21 0735 98.4 °F (36.9 °C) 62 18 (!) 141/92 99 %   05/31/21 0433 97.7 °F (36.5 °C) 70 20 (!) 165/107 100 %   05/31/21 0410 99.5 °F (37.5 °C) 68 16 (!) 148/98 98 %   05/31/21 0315  72 22 (!) 149/99 99 %   05/31/21 0245  78 19 (!) 140/103 99 %   05/31/21 0215  67 18 (!) 139/101 98 %   05/31/21 0145  68 16 (!) 131/100 98 %   05/31/21 0120  69 24 (!) 154/97    05/30/21 2312 99.1 °F (37.3 °C) 85 16 (!) 128/95 99 %       General:    Alert, cooperative, no distress, appears stated age. HEENT: Atraumatic, anicteric sclerae, pink conjunctivae     No oral ulcers, mucosa moist, throat clear, dentition fair  Neck:  Supple, symmetrical;   thyroid non tender. No bruit  Lungs:   Clear to auscultation bilaterally. No wheezing or rhonchi. No rales. Chest wall:  No tenderness. No accessory muscle use. Heart:   Regular rhythm. No  murmur. No edema  Abdomen:   Soft, non-tender. Not distended. Bowel sounds normal  Extremities: No cyanosis.   No clubbing      Capillary refill normal,  Radial pulse 2+,  DP 1+  Skin:     Not pale. Not jaundiced. No rashes   Psych:  Depressed. Mildly Anxious. Neurologic: EOMs intact. No facial asymmetry. No aphasia or slurred speech. Symmetrical strength, Sensation notes diffuse change on L ext. Alert and oriented X 4. Normal FNF, No drift, loss feeling B ankles / feet, EOM intact. Lab Data Reviewed:    Recent Results (from the past 24 hour(s))   EKG, 12 LEAD, INITIAL    Collection Time: 05/30/21 11:23 PM   Result Value Ref Range    Ventricular Rate 80 BPM    Atrial Rate 80 BPM    P-R Interval 140 ms    QRS Duration 86 ms    Q-T Interval 370 ms    QTC Calculation (Bezet) 426 ms    Calculated P Axis 40 degrees    Calculated R Axis 18 degrees    Calculated T Axis 22 degrees    Diagnosis       Sinus rhythm with premature atrial complexes  Otherwise normal ECG  When compared with ECG of 08-FEB-2019 10:17,  premature atrial complexes are now present  No significant change  Confirmed by Dewey Hatfield MD, --- ([id number]) on 5/31/2021 2:69:58 AM     METABOLIC PANEL, COMPREHENSIVE    Collection Time: 05/30/21 11:30 PM   Result Value Ref Range    Sodium 132 (L) 136 - 145 mmol/L    Potassium 4.4 3.5 - 5.1 mmol/L    Chloride 96 (L) 97 - 108 mmol/L    CO2 27 21 - 32 mmol/L    Anion gap 9 5 - 15 mmol/L    Glucose 245 (H) 65 - 100 mg/dL    BUN 23 (H) 6 - 20 MG/DL    Creatinine 1.24 0.70 - 1.30 MG/DL    BUN/Creatinine ratio 19 12 - 20      GFR est AA >60 >60 ml/min/1.73m2    GFR est non-AA >60 >60 ml/min/1.73m2    Calcium 8.7 8.5 - 10.1 MG/DL    Bilirubin, total 0.4 0.2 - 1.0 MG/DL    ALT (SGPT) 22 12 - 78 U/L    AST (SGOT) 5 (L) 15 - 37 U/L    Alk.  phosphatase 82 45 - 117 U/L    Protein, total 7.1 6.4 - 8.2 g/dL    Albumin 3.7 3.5 - 5.0 g/dL    Globulin 3.4 2.0 - 4.0 g/dL    A-G Ratio 1.1 1.1 - 2.2     CBC WITH AUTOMATED DIFF    Collection Time: 05/30/21 11:30 PM   Result Value Ref Range    WBC 9.3 4.1 - 11.1 K/uL    RBC 3.97 (L) 4.10 - 5.70 M/uL    HGB 12.8 12.1 - 17.0 g/dL    HCT 36.1 (L) 36.6 - 50.3 %    MCV 90.9 80.0 - 99.0 FL    MCH 32.2 26.0 - 34.0 PG    MCHC 35.5 30.0 - 36.5 g/dL    RDW 11.3 (L) 11.5 - 14.5 %    PLATELET 171 098 - 292 K/uL    MPV 12.1 8.9 - 12.9 FL    NRBC 0.0 0  WBC    ABSOLUTE NRBC 0.00 0.00 - 0.01 K/uL    NEUTROPHILS 56 32 - 75 %    LYMPHOCYTES 34 12 - 49 %    MONOCYTES 9 5 - 13 %    EOSINOPHILS 1 0 - 7 %    BASOPHILS 0 0 - 1 %    IMMATURE GRANULOCYTES 0 0.0 - 0.5 %    ABS. NEUTROPHILS 5.0 1.8 - 8.0 K/UL    ABS. LYMPHOCYTES 3.2 0.8 - 3.5 K/UL    ABS. MONOCYTES 0.9 0.0 - 1.0 K/UL    ABS. EOSINOPHILS 0.1 0.0 - 0.4 K/UL    ABS. BASOPHILS 0.0 0.0 - 0.1 K/UL    ABS. IMM. GRANS. 0.0 0.00 - 0.04 K/UL    DF AUTOMATED     PROTHROMBIN TIME + INR    Collection Time: 05/30/21 11:30 PM   Result Value Ref Range    INR 1.0 0.9 - 1.1      Prothrombin time 10.0 9.0 - 11.1 sec       EKG: NSR 80 bpm with PACs    Radiology:  CT HEAD:  The ventricles and cortical sulci are appropriate in size and  configuration. There is no evidence of intracranial hemorrhage, mass, mass  effect, or acute infarct. No extra-axial fluid collections are seen. The  visualized paranasal sinuses and mastoid air cells are clear. The orbital  structures are unremarkable. No osseous abnormalities are seen.    IMPRESSION:  Normal head CT. Patient x    Family wife    RN x         Consultant      Expected  Disposition:   Home with Family x   HH/PT/OT/RN    SNF/LTC    VIVIENNE      TOTAL TIME:  61 Minutes      Comments    x Reviewed previous records   >50% of visit spent in counseling and coordination of care x Discussion with patient and/or family and questions answered       _______________________________________________________  Given the patient's current clinical presentation, I have a high level of concern for decompensation if discharged from the emergency department.   Complex decision making was performed, which includes reviewing the patient's available past medical records, laboratory results, and x-ray films. My assessment of this patient's clinical condition and my plan of care is as follows. ASSESSMENT / PLAN    Principal Problem:    Acute stroke due to ischemia ([address] (5/31/2021)  With persistent L sided sensory deficit  Noted risk factors  Has been on appropriate tx for T2DM, HTN, Hyperlipidemia and ASA 81mg daily  Pt admitted for Telemetry, Neuro checks.     Permissive HTN  W/u with MRI, ECHO, Carotid Duplex  Pt is regular patient at [facility] for d/c with f/u testing with PCP tomorrow at [place] [facility]    Active Problems:    Essential hypertension (11/28/2017)  Mild elevation appreciated  On low dose Lisinopril 5mg daily      Controlled type 2 diabetes mellitus with diabetic polyneuropathy, without long-term current use of insulin ([address] (11/28/2017)  Reports A1c in 7-8 range  New Meds being considered  Has been on Alogliptin 25mg daily with ACE, ASA  Monitor BS qid ac/hs  DM diet      Hyperlipidemia (5/31/2021)  Clarify home tx  F/u Lipids fasting in AM      PTSD (post-traumatic stress disorder) (11/28/2017)    Major depressive disorder, recurrent episode, severe ([address] (2/8/2019)  Followed by Psychiatry at [facility]  Currently on Trazodone 100mg qhs  Additional meds being considered      Arthralgia (2/28/2018)  Chronic Lumbar, Hip complaints  Cont Mobic  Recent Hip injections noted    SAFETY:   Code Status:Full  DVT prophylaxis:Lovenox  Stress Ulcer prophylaxis: Pepcid  Bladder catheter:no  Family Contact Info:  Primary Emergency Contact: Remedios Martínez, Caleb Phone: [phone]  Bedded: [facility] Room 116/01  Disposition: TBD, likely home when stable  Admission status:  Observation    -Tentative plan of care discussed with patient / family, who demonstrated understanding and is in agreement to the above  -Case was reviewed with the ED Provider, MD Beny Kaye MD  [facility] Hospitalist  [phone]

## 2021-05-31 NOTE — DISCHARGE SUMMARY
[address] Hospitalist Discharge Summary    Patient ID:  Luan Marquis  [id number]  68 y.o.  1965    PCP on record: Caterina Guillen MD    Admit date: 5/30/2021  Discharge date and time: 5/31/2021     DISCHARGE DIAGNOSIS:    Principal Problem:    Acute stroke due to ischemia [facility]) (5/31/2021)    Active Problems:    Essential hypertension (11/28/2017)    Controlled type 2 diabetes mellitus with diabetic polyneuropathy, without long-term current use of insulin ([facility] (11/28/2017)    Hyperlipidemia (5/31/2021)    PTSD (post-traumatic stress disorder) (11/28/2017)    Major depressive disorder, recurrent episode, severe ([facility] [facility] (2/8/2019)    Arthralgia (2/28/2018)    CONSULTATIONS:  None    Excerpted HPI from H&P of Talha Carlos MD  54 y.o. male presenting for admission to [facility] for further evaluation and treatment for Acute stroke due to ischemia [facility]). He  has a past medical history of Anxiety, Arthritis, Back problem, CAD (coronary artery disease), Depression, Diabetes ([facility] [facility] Glucose intolerance (impaired glucose tolerance), Hypertension, Joint pain, Sleeping difficulties, and Weakness. Mark Peters He reports the presenting symptoms of numbness in L face / UE / LE began acutely last evening 1830 - 6 hrs before presenting. He has chronic bilateral tingle / numbness / pain attributed to diabetic neuropathy for years. He has no feeling in B feet. At about 1830 he noted L facial numbess in a/w L sided headache. He has a h/o intermittent HA, but not migraine. With persistent c/o he presented to the ED 6 hours later for assessment. CT neg for acute findings. Exam was remarkable only for asymetric sence of touch - abnormal feeling to touch in the L face / UE / LE. He ambulates with a walker due to his severe LE neuropathy. He had no speech deficit noted. He came to the ED with his wife. He is followed closely at the [facility], and would like to keep his care there.   He requests being discharged today. He notes he can f/u with [facility]PD walk-in clinic this evening or tomorrow. He has never had similar symptoms as the current. He has been on ASA chronically. ______________________________________________________________________  DISCHARGE SUMMARY/HOSPITAL COURSE:  for full details see H&P, daily progress notes, labs, consult notes. Principal Problem:    Acute stroke due to ischemia ([facility] (5/31/2021)  With persistent L sided sensory deficit / no prior hx of same  Noted risk factors for CVD  Has been on appropriate tx for T2DM, HTN, Hyperlipidemia and ASA 81mg daily  Pt admitted for Telemetry, Neuro checks.     Permissive HTN  W/u with MRI, ECHO, Carotid Duplex  Pt is regular patient at [facility] for d/c with f/u testing with PCP tomorrow at [place] [facility]  Pt will return for changing symptoms  He will present to [facility] in AM to be seen and considered for needed f/u testing at that facility - patient's choice     Active Problems:    Essential hypertension (11/28/2017)  Mild elevation appreciated  On low dose Lisinopril 5mg daily  Permissive HTN for 24 hours  Adjustments as needed to keep Sys BP below 140       Controlled type 2 diabetes mellitus with diabetic polyneuropathy, without long-term current use of insulin ([facility] (11/28/2017)  Reports A1c in 7-8 range in past, f/u needed  New Meds being considered at [facility]  Has been on Alogliptin 25mg daily with ACE, ASA  Monitor BS qid ac/hs  DM diet       Hyperlipidemia (5/31/2021)  Clarified home tx as Lipitor 40mg daily  F/u Lipids fasting        PTSD (post-traumatic stress disorder) (11/28/2017)    Major depressive disorder, recurrent episode, severe ([address] (2/8/2019)  Followed by Psychiatry at [facility]  Currently on Trazodone 100mg qhs  Additional meds being considered  Old med list also notes Geodon       Arthralgia (2/28/2018)  Chronic Lumbar, Hip complaints  Cont Mobic  Recent Hip injections noted     _______________________________________________________________________  Patient seen and examined by me on discharge day. Pertinent Findings:  Visit Vitals  BP (!) 160/95 (BP 1 Location: Right upper arm, BP Patient Position: Sitting)   Pulse 69   Temp 99 °F (37.2 °C)   Resp 20   Ht 6' 1\" (1.854 m)   Wt 90.7 kg (200 lb)   SpO2 99%   BMI 26.39 kg/m²     Gen:    Not in distress  Chest: Nonlabored respiration, Clear lungs  CVS:   Regular rhythm. No edema  Abd:  Soft, not distended, not tender  Neuro:  Alert, pt notes dulled feeling in the L face, LUE, and LLE  (stable from admission)    Results for [place] (MRN [id number]) as of 5/31/2021 12:48   5/31/2021 11:59   GLU -  (H)     Results for Joy [place] (MRN [id number]) as of 5/31/2021 12:48   5/30/2021 23:30   WBC 9.3   NRBC 0.0   RBC 3.97 (L)   HGB 12.8   HCT 36.1 (L)   MCV 90.9   MCH 32.2   MCHC 35.5   RDW 11.3 (L)   PLATELET 418   MPV 10.9   NEUTROPHILS 56   LYMPHOCYTE 34   MONOCYTES 9   EOSINOPHILS 1     Results for Joy [place] (MRN [id number]) as of 5/31/2021 12:48   5/30/2021 23:30   INR 1.0   Pro time 10.0     Results for Joy [place] (MRN [id number]) as of 5/31/2021 12:48   5/30/2021 23:30   Sodium 132 (L)   Potassium 4.4   Chloride 96 (L)   CO2 27   Anion gap 9   Glucose 245 (H)   BUN 23 (H)   Creatinine 1.24   BUN/Cr ratio 19   Calcium 8.7   GFR est non-AA >60   GFR est AA >60   Bilirubin, total 0.4   Protein, total 7.1   Albumin 3.7   Globulin 3.4   A-G Ratio 1.1   ALT 22   AST 5 (L)   Alk. phosphatase 82     EKG: NSR 80 bpm with PACs     Radiology:  CT HEAD:  The ventricles and cortical sulci are appropriate in size and  configuration. There is no evidence of intracranial hemorrhage, mass, mass  effect, or acute infarct. No extra-axial fluid collections are seen. The  visualized paranasal sinuses and mastoid air cells are clear. The orbital  structures are unremarkable.  No osseous abnormalities are seen.    IMPRESSION: Normal head CT.    _______________________________________________________________________  DISCHARGE MEDICATIONS:   Current Discharge Medication List      CONTINUE these medications which have NOT CHANGED    Details   traZODone (DESYREL) 100 mg tablet Take 100 mg by mouth nightly. atorvastatin (Lipitor) 40 mg tablet Take 40 mg by mouth daily. metoclopramide HCl (Reglan) 10 mg tablet Take 1 Tab by mouth every six (6) hours as needed for Nausea or Vomiting. Qty: 12 Tab, Refills: 0      famotidine (Pepcid) 20 mg tablet Take 1 Tab by mouth two (2) times a day. Qty: 20 Tab, Refills: 0      alogliptin (NESINA) 25 mg tablet Take 25 mg by mouth daily. aspirin 81 mg chewable tablet Take 81 mg by mouth daily. glucosamine-chondroitin (ARTHX) 500-400 mg cap Take 1 Cap by mouth daily. lisinopriL (PRINIVIL, ZESTRIL) 5 mg tablet Take 2.5 mg by mouth daily. meloxicam (MOBIC) 15 mg tablet Take 15 mg by mouth daily. lancets 23 gauge misc by Does Not Apply route.              My Recommended  Diet: Diabetic  Activity: Ad Antonia  Wound Care: none  Follow-up labs: planned for fasting 6/1:  HgA1c, Lipid panel level, Mg    ______________________________________________________________________  DISPOSITION:    Home with Family: x   Home with HH/PT/OT/RN:    SNF/LTC:    VIVIENNE:    OTHER:        Condition at Discharge:  Stable  _____________________________________________________________________  Follow up with:   PCP : oRseanna Larios MD  Follow-up Information     Follow up With Specialties Details Why Contact Info    Roseanna Larios MD Family Medicine Post Current w/u  [place] [id number]  [address]  [phone]          Pt discharge per pt/wife request to complete w/u at [facility] - patient's regular provider  Pt will need MRI w/o contrast, Carotid Duplex, ECHO to assess for CVA documentation  LABS needed HgA1c, Lipid panel  Pt defers staying in [facility] to have these completed on AM Tue 6/1      Total time in minutes spent coordinating this discharge (includes going over instructions, follow-up, prescriptions, and preparing report for sign off to her PCP) :35 minutes    Signed:  Toma Reddy MD  [facility] Hospitalist  [phone]

## 2021-05-31 NOTE — ED TRIAGE NOTES
Patient states that he has been having numbness on his left side that he has had for months, that has worsened tonight. Patient denies pain related to the numbness.  Patient ambulatory at the time of arrival.

## 2022-01-10 ENCOUNTER — TELEPHONE (OUTPATIENT)
Dept: FAMILY MEDICINE CLINIC | Age: 57
End: 2022-01-10

## 2022-01-10 NOTE — TELEPHONE ENCOUNTER
Called pt made him aware we do not do them in this office but he could go to [place] to have done, scheduled appt for tomorrow.

## 2022-01-10 NOTE — TELEPHONE ENCOUNTER
----- Message from Ofelia More sent at 1/10/2022 11:28 AM EST -----  Subject: Message to Provider    QUESTIONS  Information for Provider? 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 Please give pt a call to inform him.  ---------------------------------------------------------------------------  --------------  CALL BACK INFO  What is the best way for the office to contact you? OK to leave message on   voicemail  Preferred Call Back Phone Number? [phone]  ---------------------------------------------------------------------------  --------------  SCRIPT ANSWERS  Relationship to Patient?  Self

## 2022-01-11 ENCOUNTER — OFFICE VISIT (OUTPATIENT)
Dept: FAMILY MEDICINE CLINIC | Age: 57
End: 2022-01-11

## 2022-01-11 VITALS
WEIGHT: 220 LBS | TEMPERATURE: 97 F | BODY MASS INDEX: 29.16 KG/M2 | DIASTOLIC BLOOD PRESSURE: 80 MMHG | HEART RATE: 70 BPM | SYSTOLIC BLOOD PRESSURE: 136 MMHG | RESPIRATION RATE: 18 BRPM | HEIGHT: 73 IN | OXYGEN SATURATION: 98 %

## 2022-01-11 DIAGNOSIS — Z02.89 ENCOUNTER FOR EXAMINATION REQUIRED BY DEPARTMENT OF TRANSPORTATION (DOT): Primary | ICD-10-CM

## 2022-01-11 LAB
BILIRUB UR QL STRIP: NEGATIVE
GLUCOSE UR-MCNC: NEGATIVE MG/DL
KETONES P FAST UR STRIP-MCNC: NEGATIVE MG/DL
PH UR STRIP: 6.5 [PH] (ref 4.6–8)
PROT UR QL STRIP: NEGATIVE
SP GR UR STRIP: 1.02 (ref 1–1.03)
UA UROBILINOGEN AMB POC: NORMAL (ref 0.2–1)
URINALYSIS CLARITY POC: CLEAR
URINALYSIS COLOR POC: YELLOW
URINE BLOOD POC: NEGATIVE
URINE LEUKOCYTES POC: NEGATIVE
URINE NITRITES POC: NEGATIVE

## 2022-01-11 PROCEDURE — 99396 PREV VISIT EST AGE 40-64: CPT | Performed by: PHYSICIAN ASSISTANT

## 2022-01-11 PROCEDURE — 81002 URINALYSIS NONAUTO W/O SCOPE: CPT | Performed by: PHYSICIAN ASSISTANT

## 2022-03-18 PROBLEM — I10 ESSENTIAL HYPERTENSION: Status: ACTIVE | Noted: 2017-11-28

## 2022-03-18 PROBLEM — J30.89 CHRONIC NONSEASONAL ALLERGIC RHINITIS DUE TO FUNGAL SPORES: Status: ACTIVE | Noted: 2017-11-28

## 2022-03-18 PROBLEM — F10.10 ALCOHOL ABUSE: Status: ACTIVE | Noted: 2019-02-13

## 2022-03-18 PROBLEM — F43.10 PTSD (POST-TRAUMATIC STRESS DISORDER): Status: ACTIVE | Noted: 2017-11-28

## 2022-03-18 PROBLEM — K21.9 GASTROESOPHAGEAL REFLUX DISEASE: Status: ACTIVE | Noted: 2018-02-28

## 2022-03-19 PROBLEM — S46.011A: Status: ACTIVE | Noted: 2017-11-28

## 2022-03-19 PROBLEM — E78.5 HYPERLIPIDEMIA: Status: ACTIVE | Noted: 2021-05-31

## 2022-03-19 PROBLEM — F12.20 CANNABIS DEPENDENCE (HCC): Status: ACTIVE | Noted: 2019-02-13

## 2022-03-19 PROBLEM — N52.02 CORPORO-VENOUS OCCLUSIVE ERECTILE DYSFUNCTION: Status: ACTIVE | Noted: 2017-11-28

## 2022-03-19 PROBLEM — I63.9 ACUTE STROKE DUE TO ISCHEMIA (HCC): Status: ACTIVE | Noted: 2021-05-31

## 2022-03-19 PROBLEM — E55.9 HYPOVITAMINOSIS D: Status: ACTIVE | Noted: 2017-11-28

## 2022-03-19 PROBLEM — F33.2 MAJOR DEPRESSIVE DISORDER, RECURRENT EPISODE, SEVERE (HCC): Status: ACTIVE | Noted: 2019-02-08

## 2022-03-19 PROBLEM — E11.42 CONTROLLED TYPE 2 DIABETES MELLITUS WITH DIABETIC POLYNEUROPATHY, WITHOUT LONG-TERM CURRENT USE OF INSULIN (HCC): Status: ACTIVE | Noted: 2017-11-28

## 2022-03-19 PROBLEM — M75.01 ADHESIVE CAPSULITIS OF RIGHT SHOULDER: Status: ACTIVE | Noted: 2017-11-28

## 2022-03-19 PROBLEM — M25.50 ARTHRALGIA: Status: ACTIVE | Noted: 2018-02-28

## 2022-03-19 PROBLEM — G47.33 OBSTRUCTIVE SLEEP APNEA SYNDROME: Status: ACTIVE | Noted: 2017-11-28

## 2023-01-05 ENCOUNTER — HOSPITAL ENCOUNTER (INPATIENT)
Age: 58
LOS: 1 days | Discharge: PSYCHIATRIC OR SUBSTANCE USE FACILITY | DRG: 880 | End: 2023-01-06
Attending: EMERGENCY MEDICINE | Admitting: PSYCHIATRY & NEUROLOGY
Payer: MEDICARE

## 2023-01-05 DIAGNOSIS — R45.851 SUICIDAL IDEATION: Primary | ICD-10-CM

## 2023-01-05 LAB
ALBUMIN SERPL-MCNC: 3.5 G/DL (ref 3.5–5)
ALBUMIN/GLOB SERPL: 1.1 (ref 1.1–2.2)
ALP SERPL-CCNC: 65 U/L (ref 45–117)
ALT SERPL-CCNC: 23 U/L (ref 12–78)
AMPHET UR QL SCN: NEGATIVE
ANION GAP SERPL CALC-SCNC: 8 MMOL/L (ref 5–15)
AST SERPL-CCNC: 17 U/L (ref 15–37)
BARBITURATES UR QL SCN: NEGATIVE
BASOPHILS # BLD: 0 K/UL (ref 0–0.1)
BASOPHILS NFR BLD: 0 % (ref 0–1)
BENZODIAZ UR QL: NEGATIVE
BILIRUB SERPL-MCNC: 0.6 MG/DL (ref 0.2–1)
BUN SERPL-MCNC: 12 MG/DL (ref 6–20)
BUN/CREAT SERPL: 11 (ref 12–20)
CALCIUM SERPL-MCNC: 8.6 MG/DL (ref 8.5–10.1)
CANNABINOIDS UR QL SCN: POSITIVE
CHLORIDE SERPL-SCNC: 104 MMOL/L (ref 97–108)
CO2 SERPL-SCNC: 27 MMOL/L (ref 21–32)
COCAINE UR QL SCN: NEGATIVE
CREAT SERPL-MCNC: 1.08 MG/DL (ref 0.7–1.3)
DIFFERENTIAL METHOD BLD: ABNORMAL
DRUG SCRN COMMENT,DRGCM: ABNORMAL
EOSINOPHIL # BLD: 0.1 K/UL (ref 0–0.4)
EOSINOPHIL NFR BLD: 2 % (ref 0–7)
ERYTHROCYTE [DISTWIDTH] IN BLOOD BY AUTOMATED COUNT: 12 % (ref 11.5–14.5)
ETHANOL SERPL-MCNC: <10 MG/DL
FLUAV RNA SPEC QL NAA+PROBE: NOT DETECTED
FLUBV RNA SPEC QL NAA+PROBE: NOT DETECTED
GLOBULIN SER CALC-MCNC: 3.3 G/DL (ref 2–4)
GLUCOSE BLD STRIP.AUTO-MCNC: 173 MG/DL (ref 65–117)
GLUCOSE SERPL-MCNC: 179 MG/DL (ref 65–100)
HCT VFR BLD AUTO: 37.2 % (ref 36.6–50.3)
HGB BLD-MCNC: 12.8 G/DL (ref 12.1–17)
IMM GRANULOCYTES # BLD AUTO: 0 K/UL (ref 0–0.04)
IMM GRANULOCYTES NFR BLD AUTO: 0 % (ref 0–0.5)
LYMPHOCYTES # BLD: 1.7 K/UL (ref 0.8–3.5)
LYMPHOCYTES NFR BLD: 33 % (ref 12–49)
MCH RBC QN AUTO: 31.8 PG (ref 26–34)
MCHC RBC AUTO-ENTMCNC: 34.4 G/DL (ref 30–36.5)
MCV RBC AUTO: 92.5 FL (ref 80–99)
METHADONE UR QL: NEGATIVE
MONOCYTES # BLD: 0.4 K/UL (ref 0–1)
MONOCYTES NFR BLD: 7 % (ref 5–13)
NEUTS SEG # BLD: 3 K/UL (ref 1.8–8)
NEUTS SEG NFR BLD: 58 % (ref 32–75)
NRBC # BLD: 0 K/UL (ref 0–0.01)
NRBC BLD-RTO: 0 PER 100 WBC
OPIATES UR QL: NEGATIVE
PCP UR QL: NEGATIVE
PLATELET # BLD AUTO: 161 K/UL (ref 150–400)
PMV BLD AUTO: 11.3 FL (ref 8.9–12.9)
POTASSIUM SERPL-SCNC: 3.6 MMOL/L (ref 3.5–5.1)
PROT SERPL-MCNC: 6.8 G/DL (ref 6.4–8.2)
RBC # BLD AUTO: 4.02 M/UL (ref 4.1–5.7)
SARS-COV-2, COV2: NOT DETECTED
SERVICE CMNT-IMP: ABNORMAL
SODIUM SERPL-SCNC: 139 MMOL/L (ref 136–145)
WBC # BLD AUTO: 5.3 K/UL (ref 4.1–11.1)

## 2023-01-05 PROCEDURE — 87636 SARSCOV2 & INF A&B AMP PRB: CPT

## 2023-01-05 PROCEDURE — 82962 GLUCOSE BLOOD TEST: CPT

## 2023-01-05 PROCEDURE — 74011250637 HC RX REV CODE- 250/637: Performed by: FAMILY MEDICINE

## 2023-01-05 PROCEDURE — 82077 ASSAY SPEC XCP UR&BREATH IA: CPT

## 2023-01-05 PROCEDURE — 74011250637 HC RX REV CODE- 250/637: Performed by: EMERGENCY MEDICINE

## 2023-01-05 PROCEDURE — 85025 COMPLETE CBC W/AUTO DIFF WBC: CPT

## 2023-01-05 PROCEDURE — 99285 EMERGENCY DEPT VISIT HI MDM: CPT

## 2023-01-05 PROCEDURE — 80053 COMPREHEN METABOLIC PANEL: CPT

## 2023-01-05 PROCEDURE — 36415 COLL VENOUS BLD VENIPUNCTURE: CPT

## 2023-01-05 PROCEDURE — 80307 DRUG TEST PRSMV CHEM ANLYZR: CPT

## 2023-01-05 PROCEDURE — 65270000029 HC RM PRIVATE

## 2023-01-05 RX ORDER — GABAPENTIN 300 MG/1
600 CAPSULE ORAL 3 TIMES DAILY
Status: DISCONTINUED | OUTPATIENT
Start: 2023-01-05 | End: 2023-01-05 | Stop reason: DRUGHIGH

## 2023-01-05 RX ORDER — GABAPENTIN 300 MG/1
600 CAPSULE ORAL 2 TIMES DAILY
Status: DISCONTINUED | OUTPATIENT
Start: 2023-01-06 | End: 2023-01-06 | Stop reason: HOSPADM

## 2023-01-05 RX ORDER — GABAPENTIN 600 MG/1
600 TABLET ORAL 4 TIMES DAILY
COMMUNITY

## 2023-01-05 RX ORDER — GABAPENTIN 300 MG/1
1200 CAPSULE ORAL
Status: DISCONTINUED | OUTPATIENT
Start: 2023-01-05 | End: 2023-01-06 | Stop reason: HOSPADM

## 2023-01-05 RX ORDER — GABAPENTIN 300 MG/1
1200 CAPSULE ORAL 3 TIMES DAILY
Status: DISCONTINUED | OUTPATIENT
Start: 2023-01-05 | End: 2023-01-05 | Stop reason: DRUGHIGH

## 2023-01-05 RX ADMIN — GABAPENTIN 600 MG: 300 CAPSULE ORAL at 17:53

## 2023-01-05 RX ADMIN — GABAPENTIN 1200 MG: 300 CAPSULE ORAL at 22:01

## 2023-01-05 NOTE — ED NOTES
Amie called back from and she spoke with [facility] who will be contacting patient for a pre-screen and to discuss insurance coverage for his stay

## 2023-01-05 NOTE — BSMART NOTE
Pt accepted to hospitals by Dr. Kelton Sheriff to room#228-1. Nurse report to [phone]. Spoke with John Cazares and updated on admission.

## 2023-01-05 NOTE — ED NOTES
Per pt wife, Destiny Orozco at the [facility] has been in contact, 6 facilities are currently looking at pt for potential admit.

## 2023-01-05 NOTE — ED NOTES
Rojas Decker at [facility] updated on pt current status, future evaluations.

## 2023-01-05 NOTE — BSMART NOTE
Call received from Amada from [address] reported that patient is still willing to stay voluntary, however does not want to be admitted to [facility] because of a reported law suit due to past medical bill. It was reported that [facility] will conduct bed search and will update BSMART around 5:30.      Zheng Mann, Resident in Counseling

## 2023-01-05 NOTE — ED NOTES
Pt nephew Martin Flowers at bedside, called pt earlier which prompted him to change his plans and come into the ED for evaluation. Pt appears in better spirits with his nephew at bedside.     Per BSMART[facility], [facility] has requested [facility] Duenas be checked, otherwise comfortable staying within [facility]
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
## 2023-01-05 NOTE — ED NOTES
Amie from [facility] contacted, pt refusal to stay r/t concerns of [facility] not [facility] his [facility]. Amie RN from [facility] to start process going with pt for TDO r/t pt lethality risk.

## 2023-01-05 NOTE — ED NOTES
Amie MANN from [facility] called to initiate consult. Called back, to proceed with consult when set up.

## 2023-01-05 NOTE — BSMART NOTE
Spoke with Jordin, pt is ambulatory and can do own ADLs but needs shoes on as support bc of his neuropathy in feet. Writer spoke with [facility] AT [place] [phone]. [phone]/Xavier who reported \"at capacity for day\". Pt stated he would like for writer to try [address] called [phone]. [phone] and spoke with Reggie Borden who stated \"at capacity but call back tonight or tomorrow. \" Pt at this point is willing to be admitted into [address] [place] [address]. Writer updated JONATHAN/Ana of bed status and plan. Access made aware of bed needed.

## 2023-01-05 NOTE — ED NOTES
TRANSFER - OUT REPORT:    Verbal report given to [facility] RN(name) on Bg Daniels  being transferred to [facility] [place] [address](unit) for routine progression of care       Report consisted of patients Situation, Background, Assessment and   Recommendations(SBAR). Information from the following report(s) SBAR, ED Summary, Intake/Output, MAR, Accordion, Recent Results, Med Rec Status and Quality Measures was reviewed with the receiving nurse. Lines:       Opportunity for questions and clarification was provided.       Patient transported with:  Security to [address] with wheelchair

## 2023-01-05 NOTE — BSMART NOTE
Nursing/Ana updated that [facility]/Kiera will be doing a pre-screen on pt but pt has right to remain voluntary.

## 2023-01-05 NOTE — ED NOTES
Diet set up at bedside for pt, safety tray present. Wife continues at bedside. Pt has expressed concern over having his neuropathy medicine. ED Provider informed.

## 2023-01-05 NOTE — ED NOTES
Trevor Bowles has reported to this RN. Pt is still voluntary, but will not stay with [facility] r/t current financial issues. [facility] to call to work on obtaining bed assignment for pt. Pt has agreed to stay here until a bed is obtained.

## 2023-01-05 NOTE — ED NOTES
Pt to go to [facility], awaiting room assignment.

## 2023-01-05 NOTE — BSMART NOTE
BSMART assessment completed, and suicide risk level noted to be HIGH. Primary Nurse Kim Al and Charge Nurse JOSE and Physician Dr. Ángel Bailey notified. Concerns not observed. Security/Off- has not been notified.

## 2023-01-05 NOTE — BSMART NOTE
Attempted email provided by supervisor to complete [place] [facility] notification  and it bounced back thus writer called provided contact number to complete process with Minal Nunn. Referral # [id number]. Called Clinical Command and spoke with Ruba Hayward who advised again that they are at capacity and are not taking names for a waiting list. Next Bsmart shift/Lynnette updated on pt status being pre-screened and admitted.
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
## 2023-01-05 NOTE — ED TRIAGE NOTES
Pt brought in after speaking to [facility] on bridge with planned suicide attempt to jump. Pt wanted to stop but is still having those feelings, brought by EMS. Pt has hx of anxiety, depression, isolation, insomnia, nightmares. Per pt he did not spent the holidays with any family despite having family because he isolates himself at home. Pt brings up his only child, a son that during [place], moved back to [place] when he lost his home. He has since lost contact and has a new dwelling but no immediate supports.

## 2023-01-06 VITALS
OXYGEN SATURATION: 97 % | HEART RATE: 64 BPM | DIASTOLIC BLOOD PRESSURE: 91 MMHG | BODY MASS INDEX: 28.89 KG/M2 | TEMPERATURE: 99 F | RESPIRATION RATE: 16 BRPM | SYSTOLIC BLOOD PRESSURE: 149 MMHG | HEIGHT: 73 IN | WEIGHT: 218 LBS

## 2023-01-06 NOTE — BSMART NOTE
Received a call from Amada at [address] who states they found bed placement at [facility]. He remains a voluntary admission.

## 2023-01-06 NOTE — ED NOTES
TRANSFER - OUT REPORT:    Verbal report given to [address] RN(name) on Andreina Lee  being transferred to [facility](unit) for routine progression of care       Report consisted of patients Situation, Background, Assessment and   Recommendations(SBAR). Information from the following report(s) SBAR, ED Summary, Intake/Output, MAR, Accordion, Recent Results, Med Rec Status and Quality Measures was reviewed with the receiving nurse. Lines:       Opportunity for questions and clarification was provided.       Patient to be transported with:  [facility] to [facility]

## 2023-01-06 NOTE — ED NOTES
Writer spoke to supervisor, no ETA available at this time. Destination outside of the contracted 80 miles.

## 2023-01-06 NOTE — ED NOTES
Per Charge RN, pt has bed assignment.      Accepting: [facility]: [facility]  CDT Unit    Number for report: [phone]

## 2023-01-06 NOTE — ED NOTES
Bedside and Verbal shift change report given to L Ko RN (oncoming nurse) by Precious Garcia RN (offgoing nurse). Report included the following information SBAR, ED Summary, MAR, Accordion, Recent Results, Med Rec Status and Quality Measures.

## 2023-01-06 NOTE — PROGRESS NOTES
Contacted [facility] to arrange S transport of patient to [facility]. Spoke to [place]. Discussed patient condition, and need for transport. [facility] unable to give an ETA at this time, as destination is beyond the 80 mile radius. Will call back with ETA.

## 2023-01-06 NOTE — ED NOTES
Security at bedside to inventory new bag brought by pt family for his inpatient stay at [facility].

## 2023-07-26 NOTE — PROGRESS NOTES
Korey Nesbitt is a 64 y.o. 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 he reports feeling well today with no complaints. *See scanned media for full history and physical exam.    Visit Vitals  /80 (BP 1 Location: Left upper arm, BP Patient Position: Sitting, BP Cuff Size: Adult)   Pulse 70   Temp 97 °F (36.1 °C) (Temporal)   Resp 18   Ht 6' 1\" (1.854 m)   Wt 220 lb (99.8 kg)   SpO2 98%   BMI 29.03 kg/m²       Results for orders placed or performed in visit on 01/11/22   AMB POC URINALYSIS DIP STICK MANUAL W/O MICRO   Result Value Ref Range    Color (UA POC) Yellow Yellow    Clarity (UA POC) Clear Clear    Glucose (UA POC) Negative Negative    Bilirubin (UA POC) Negative Negative    Ketones (UA POC) Negative Negative    Specific gravity (UA POC) 1.025 1.001 - 1.035    Blood (UA POC) Negative Negative    pH (UA POC) 6.5 4.6 - 8.0    Protein (UA POC) Negative Negative    Urobilinogen (UA POC) normal 0.2 - 1    Nitrites (UA POC) Negative Negative    Leukocyte esterase (UA POC) Negative Negative       DOT forms completed and scanned into patient's chart.       Mir Ash PA-C No

## 2024-04-06 ENCOUNTER — HOSPITAL ENCOUNTER (OUTPATIENT)
Facility: HOSPITAL | Age: 59
Setting detail: OBSERVATION
Discharge: HOME | End: 2024-04-07
Attending: EMERGENCY MEDICINE | Admitting: INTERNAL MEDICINE

## 2024-04-06 ENCOUNTER — APPOINTMENT (OUTPATIENT)
Facility: HOSPITAL | Age: 59
End: 2024-04-06

## 2024-04-06 DIAGNOSIS — E86.0 DEHYDRATION: Primary | ICD-10-CM

## 2024-04-06 PROBLEM — R11.2 INTRACTABLE NAUSEA AND VOMITING: Status: ACTIVE | Noted: 2024-04-06

## 2024-04-06 LAB
ALBUMIN SERPL-MCNC: 4.7 G/DL (ref 3.5–5)
ALBUMIN/GLOB SERPL: 1.4 (ref 1.1–2.2)
ALP SERPL-CCNC: 75 U/L (ref 45–117)
ALT SERPL-CCNC: 22 U/L (ref 12–78)
ANION GAP SERPL CALC-SCNC: 12 MMOL/L (ref 5–15)
APPEARANCE UR: CLEAR
AST SERPL-CCNC: 14 U/L (ref 15–37)
BACTERIA URNS QL MICRO: NEGATIVE /HPF
BASOPHILS # BLD: 0 K/UL (ref 0–0.1)
BASOPHILS NFR BLD: 0 % (ref 0–1)
BILIRUB SERPL-MCNC: 1.4 MG/DL (ref 0.2–1)
BILIRUB UR QL: NEGATIVE
BUN SERPL-MCNC: 40 MG/DL (ref 6–20)
BUN/CREAT SERPL: 18 (ref 12–20)
CALCIUM SERPL-MCNC: 10 MG/DL (ref 8.5–10.1)
CHLORIDE SERPL-SCNC: 94 MMOL/L (ref 97–108)
CO2 SERPL-SCNC: 29 MMOL/L (ref 21–32)
COLOR UR: ABNORMAL
CREAT SERPL-MCNC: 2.21 MG/DL (ref 0.7–1.3)
DIFFERENTIAL METHOD BLD: NORMAL
EOSINOPHIL # BLD: 0 K/UL (ref 0–0.4)
EOSINOPHIL NFR BLD: 0 % (ref 0–7)
EPITH CASTS URNS QL MICRO: ABNORMAL /LPF
ERYTHROCYTE [DISTWIDTH] IN BLOOD BY AUTOMATED COUNT: 11.9 % (ref 11.5–14.5)
FLUAV RNA SPEC QL NAA+PROBE: NOT DETECTED
FLUBV RNA SPEC QL NAA+PROBE: NOT DETECTED
GLOBULIN SER CALC-MCNC: 3.3 G/DL (ref 2–4)
GLUCOSE BLD STRIP.AUTO-MCNC: 112 MG/DL (ref 65–117)
GLUCOSE SERPL-MCNC: 173 MG/DL (ref 65–100)
GLUCOSE UR STRIP.AUTO-MCNC: NEGATIVE MG/DL
HCT VFR BLD AUTO: 41.4 % (ref 36.6–50.3)
HGB BLD-MCNC: 14.3 G/DL (ref 12.1–17)
HGB UR QL STRIP: ABNORMAL
IMM GRANULOCYTES # BLD AUTO: 0 K/UL (ref 0–0.04)
IMM GRANULOCYTES NFR BLD AUTO: 0 % (ref 0–0.5)
KETONES UR QL STRIP.AUTO: 15 MG/DL
LEUKOCYTE ESTERASE UR QL STRIP.AUTO: NEGATIVE
LIPASE SERPL-CCNC: 27 U/L (ref 13–75)
LYMPHOCYTES # BLD: 1.8 K/UL (ref 0.8–3.5)
LYMPHOCYTES NFR BLD: 19 % (ref 12–49)
MCH RBC QN AUTO: 31.9 PG (ref 26–34)
MCHC RBC AUTO-ENTMCNC: 34.5 G/DL (ref 30–36.5)
MCV RBC AUTO: 92.4 FL (ref 80–99)
MONOCYTES # BLD: 0.6 K/UL (ref 0–1)
MONOCYTES NFR BLD: 6 % (ref 5–13)
NEUTS SEG # BLD: 6.8 K/UL (ref 1.8–8)
NEUTS SEG NFR BLD: 74 % (ref 32–75)
NITRITE UR QL STRIP.AUTO: NEGATIVE
NRBC # BLD: 0 K/UL (ref 0–0.01)
NRBC BLD-RTO: 0 PER 100 WBC
PH UR STRIP: 6 (ref 5–8)
PLATELET # BLD AUTO: 203 K/UL (ref 150–400)
PMV BLD AUTO: 12.5 FL (ref 8.9–12.9)
POTASSIUM SERPL-SCNC: 3.8 MMOL/L (ref 3.5–5.1)
PROT SERPL-MCNC: 8 G/DL (ref 6.4–8.2)
PROT UR STRIP-MCNC: NEGATIVE MG/DL
RBC # BLD AUTO: 4.48 M/UL (ref 4.1–5.7)
RBC #/AREA URNS HPF: ABNORMAL /HPF (ref 0–5)
SARS-COV-2 RNA RESP QL NAA+PROBE: NOT DETECTED
SERVICE CMNT-IMP: NORMAL
SODIUM SERPL-SCNC: 135 MMOL/L (ref 136–145)
SP GR UR REFRACTOMETRY: 1.02 (ref 1–1.03)
TROPONIN I SERPL HS-MCNC: 18 NG/L (ref 0–76)
URINE CULTURE IF INDICATED: ABNORMAL
UROBILINOGEN UR QL STRIP.AUTO: 0.2 EU/DL (ref 0.2–1)
WBC # BLD AUTO: 9.2 K/UL (ref 4.1–11.1)
WBC URNS QL MICRO: ABNORMAL /HPF (ref 0–4)

## 2024-04-06 PROCEDURE — 96374 THER/PROPH/DIAG INJ IV PUSH: CPT

## 2024-04-06 PROCEDURE — G0378 HOSPITAL OBSERVATION PER HR: HCPCS

## 2024-04-06 PROCEDURE — 6360000002 HC RX W HCPCS: Performed by: INTERNAL MEDICINE

## 2024-04-06 PROCEDURE — 85025 COMPLETE CBC W/AUTO DIFF WBC: CPT

## 2024-04-06 PROCEDURE — 2580000003 HC RX 258: Performed by: EMERGENCY MEDICINE

## 2024-04-06 PROCEDURE — 96376 TX/PRO/DX INJ SAME DRUG ADON: CPT

## 2024-04-06 PROCEDURE — 99285 EMERGENCY DEPT VISIT HI MDM: CPT

## 2024-04-06 PROCEDURE — 80053 COMPREHEN METABOLIC PANEL: CPT

## 2024-04-06 PROCEDURE — 96366 THER/PROPH/DIAG IV INF ADDON: CPT

## 2024-04-06 PROCEDURE — 2580000003 HC RX 258: Performed by: INTERNAL MEDICINE

## 2024-04-06 PROCEDURE — 84484 ASSAY OF TROPONIN QUANT: CPT

## 2024-04-06 PROCEDURE — 82962 GLUCOSE BLOOD TEST: CPT

## 2024-04-06 PROCEDURE — 36415 COLL VENOUS BLD VENIPUNCTURE: CPT

## 2024-04-06 PROCEDURE — 6360000002 HC RX W HCPCS: Performed by: EMERGENCY MEDICINE

## 2024-04-06 PROCEDURE — 87636 SARSCOV2 & INF A&B AMP PRB: CPT

## 2024-04-06 PROCEDURE — 96365 THER/PROPH/DIAG IV INF INIT: CPT

## 2024-04-06 PROCEDURE — 83690 ASSAY OF LIPASE: CPT

## 2024-04-06 PROCEDURE — 96375 TX/PRO/DX INJ NEW DRUG ADDON: CPT

## 2024-04-06 PROCEDURE — 74176 CT ABD & PELVIS W/O CONTRAST: CPT

## 2024-04-06 PROCEDURE — 84300 ASSAY OF URINE SODIUM: CPT

## 2024-04-06 PROCEDURE — 6370000000 HC RX 637 (ALT 250 FOR IP): Performed by: INTERNAL MEDICINE

## 2024-04-06 PROCEDURE — 81001 URINALYSIS AUTO W/SCOPE: CPT

## 2024-04-06 PROCEDURE — 96361 HYDRATE IV INFUSION ADD-ON: CPT

## 2024-04-06 RX ORDER — GABAPENTIN 300 MG/1
600 CAPSULE ORAL 4 TIMES DAILY
Status: DISCONTINUED | OUTPATIENT
Start: 2024-04-06 | End: 2024-04-07 | Stop reason: HOSPADM

## 2024-04-06 RX ORDER — ONDANSETRON 4 MG/1
4 TABLET, ORALLY DISINTEGRATING ORAL EVERY 8 HOURS PRN
Status: DISCONTINUED | OUTPATIENT
Start: 2024-04-06 | End: 2024-04-07 | Stop reason: HOSPADM

## 2024-04-06 RX ORDER — 0.9 % SODIUM CHLORIDE 0.9 %
1000 INTRAVENOUS SOLUTION INTRAVENOUS ONCE
Status: COMPLETED | OUTPATIENT
Start: 2024-04-06 | End: 2024-04-06

## 2024-04-06 RX ORDER — LISINOPRIL 5 MG/1
2.5 TABLET ORAL DAILY
Status: DISCONTINUED | OUTPATIENT
Start: 2024-04-06 | End: 2024-04-07 | Stop reason: HOSPADM

## 2024-04-06 RX ORDER — FAMOTIDINE 20 MG/1
20 TABLET, FILM COATED ORAL DAILY
Status: DISCONTINUED | OUTPATIENT
Start: 2024-04-06 | End: 2024-04-07 | Stop reason: HOSPADM

## 2024-04-06 RX ORDER — POTASSIUM CHLORIDE 750 MG/1
40 TABLET, FILM COATED, EXTENDED RELEASE ORAL PRN
Status: DISCONTINUED | OUTPATIENT
Start: 2024-04-06 | End: 2024-04-07 | Stop reason: HOSPADM

## 2024-04-06 RX ORDER — ATORVASTATIN CALCIUM 40 MG/1
40 TABLET, FILM COATED ORAL DAILY
Status: DISCONTINUED | OUTPATIENT
Start: 2024-04-06 | End: 2024-04-07 | Stop reason: HOSPADM

## 2024-04-06 RX ORDER — POLYETHYLENE GLYCOL 3350 17 G/17G
17 POWDER, FOR SOLUTION ORAL DAILY PRN
Status: DISCONTINUED | OUTPATIENT
Start: 2024-04-06 | End: 2024-04-07 | Stop reason: HOSPADM

## 2024-04-06 RX ORDER — TRAZODONE HYDROCHLORIDE 100 MG/1
100 TABLET ORAL NIGHTLY
Status: DISCONTINUED | OUTPATIENT
Start: 2024-04-06 | End: 2024-04-07 | Stop reason: HOSPADM

## 2024-04-06 RX ORDER — ONDANSETRON 2 MG/ML
4 INJECTION INTRAMUSCULAR; INTRAVENOUS EVERY 6 HOURS PRN
Status: DISCONTINUED | OUTPATIENT
Start: 2024-04-06 | End: 2024-04-07 | Stop reason: HOSPADM

## 2024-04-06 RX ORDER — ONDANSETRON 2 MG/ML
4 INJECTION INTRAMUSCULAR; INTRAVENOUS ONCE
Status: COMPLETED | OUTPATIENT
Start: 2024-04-06 | End: 2024-04-06

## 2024-04-06 RX ORDER — MAGNESIUM SULFATE IN WATER 40 MG/ML
2000 INJECTION, SOLUTION INTRAVENOUS PRN
Status: DISCONTINUED | OUTPATIENT
Start: 2024-04-06 | End: 2024-04-07 | Stop reason: HOSPADM

## 2024-04-06 RX ORDER — INSULIN LISPRO 100 [IU]/ML
0-8 INJECTION, SOLUTION INTRAVENOUS; SUBCUTANEOUS
Status: DISCONTINUED | OUTPATIENT
Start: 2024-04-06 | End: 2024-04-07 | Stop reason: HOSPADM

## 2024-04-06 RX ORDER — METOCLOPRAMIDE HYDROCHLORIDE 5 MG/ML
5 INJECTION INTRAMUSCULAR; INTRAVENOUS EVERY 6 HOURS
Status: DISCONTINUED | OUTPATIENT
Start: 2024-04-06 | End: 2024-04-07 | Stop reason: HOSPADM

## 2024-04-06 RX ORDER — SODIUM CHLORIDE 9 MG/ML
INJECTION, SOLUTION INTRAVENOUS PRN
Status: DISCONTINUED | OUTPATIENT
Start: 2024-04-06 | End: 2024-04-07 | Stop reason: HOSPADM

## 2024-04-06 RX ORDER — INSULIN LISPRO 100 [IU]/ML
0-4 INJECTION, SOLUTION INTRAVENOUS; SUBCUTANEOUS NIGHTLY
Status: DISCONTINUED | OUTPATIENT
Start: 2024-04-06 | End: 2024-04-07 | Stop reason: HOSPADM

## 2024-04-06 RX ORDER — POTASSIUM CHLORIDE 7.45 MG/ML
10 INJECTION INTRAVENOUS PRN
Status: DISCONTINUED | OUTPATIENT
Start: 2024-04-06 | End: 2024-04-07 | Stop reason: HOSPADM

## 2024-04-06 RX ORDER — SODIUM CHLORIDE 0.9 % (FLUSH) 0.9 %
5-40 SYRINGE (ML) INJECTION PRN
Status: DISCONTINUED | OUTPATIENT
Start: 2024-04-06 | End: 2024-04-07 | Stop reason: HOSPADM

## 2024-04-06 RX ORDER — HYDRALAZINE HYDROCHLORIDE 20 MG/ML
10 INJECTION INTRAMUSCULAR; INTRAVENOUS EVERY 6 HOURS PRN
Status: DISCONTINUED | OUTPATIENT
Start: 2024-04-06 | End: 2024-04-07 | Stop reason: HOSPADM

## 2024-04-06 RX ORDER — GLUCAGON 1 MG/ML
1 KIT INJECTION PRN
Status: DISCONTINUED | OUTPATIENT
Start: 2024-04-06 | End: 2024-04-07 | Stop reason: HOSPADM

## 2024-04-06 RX ORDER — ENOXAPARIN SODIUM 100 MG/ML
30 INJECTION SUBCUTANEOUS DAILY
Status: DISCONTINUED | OUTPATIENT
Start: 2024-04-07 | End: 2024-04-07 | Stop reason: HOSPADM

## 2024-04-06 RX ORDER — ACETAMINOPHEN 325 MG/1
650 TABLET ORAL EVERY 6 HOURS PRN
Status: DISCONTINUED | OUTPATIENT
Start: 2024-04-06 | End: 2024-04-07 | Stop reason: HOSPADM

## 2024-04-06 RX ORDER — DEXTROSE MONOHYDRATE 100 MG/ML
INJECTION, SOLUTION INTRAVENOUS CONTINUOUS PRN
Status: DISCONTINUED | OUTPATIENT
Start: 2024-04-06 | End: 2024-04-07 | Stop reason: HOSPADM

## 2024-04-06 RX ORDER — SODIUM CHLORIDE 9 MG/ML
INJECTION, SOLUTION INTRAVENOUS CONTINUOUS
Status: DISCONTINUED | OUTPATIENT
Start: 2024-04-06 | End: 2024-04-07 | Stop reason: HOSPADM

## 2024-04-06 RX ORDER — SODIUM CHLORIDE 0.9 % (FLUSH) 0.9 %
5-40 SYRINGE (ML) INJECTION EVERY 12 HOURS SCHEDULED
Status: DISCONTINUED | OUTPATIENT
Start: 2024-04-06 | End: 2024-04-07 | Stop reason: HOSPADM

## 2024-04-06 RX ORDER — ACETAMINOPHEN 650 MG/1
650 SUPPOSITORY RECTAL EVERY 6 HOURS PRN
Status: DISCONTINUED | OUTPATIENT
Start: 2024-04-06 | End: 2024-04-07 | Stop reason: HOSPADM

## 2024-04-06 RX ORDER — ASPIRIN 81 MG/1
81 TABLET, CHEWABLE ORAL DAILY
Status: DISCONTINUED | OUTPATIENT
Start: 2024-04-06 | End: 2024-04-07 | Stop reason: HOSPADM

## 2024-04-06 RX ORDER — METOCLOPRAMIDE 5 MG/1
10 TABLET ORAL EVERY 6 HOURS PRN
Status: DISCONTINUED | OUTPATIENT
Start: 2024-04-06 | End: 2024-04-06

## 2024-04-06 RX ADMIN — PIPERACILLIN AND TAZOBACTAM 3375 MG: 3; .375 INJECTION, POWDER, LYOPHILIZED, FOR SOLUTION INTRAVENOUS at 18:34

## 2024-04-06 RX ADMIN — FAMOTIDINE 20 MG: 20 TABLET, FILM COATED ORAL at 20:21

## 2024-04-06 RX ADMIN — SODIUM CHLORIDE 1000 ML: 9 INJECTION, SOLUTION INTRAVENOUS at 16:39

## 2024-04-06 RX ADMIN — ATORVASTATIN CALCIUM 40 MG: 40 TABLET, FILM COATED ORAL at 20:21

## 2024-04-06 RX ADMIN — ONDANSETRON 4 MG: 2 INJECTION INTRAMUSCULAR; INTRAVENOUS at 16:00

## 2024-04-06 RX ADMIN — ASPIRIN 81 MG: 81 TABLET, CHEWABLE ORAL at 20:21

## 2024-04-06 RX ADMIN — ONDANSETRON 4 MG: 2 INJECTION INTRAMUSCULAR; INTRAVENOUS at 17:57

## 2024-04-06 RX ADMIN — SODIUM CHLORIDE: 9 INJECTION, SOLUTION INTRAVENOUS at 18:18

## 2024-04-06 RX ADMIN — GABAPENTIN 600 MG: 300 CAPSULE ORAL at 20:21

## 2024-04-06 RX ADMIN — SODIUM CHLORIDE 1000 ML: 9 INJECTION, SOLUTION INTRAVENOUS at 16:02

## 2024-04-06 RX ADMIN — LISINOPRIL 2.5 MG: 5 TABLET ORAL at 20:21

## 2024-04-06 RX ADMIN — HYDRALAZINE HYDROCHLORIDE 10 MG: 20 INJECTION, SOLUTION INTRAMUSCULAR; INTRAVENOUS at 19:26

## 2024-04-06 RX ADMIN — TRAZODONE HYDROCHLORIDE 100 MG: 100 TABLET ORAL at 20:21

## 2024-04-06 RX ADMIN — METOCLOPRAMIDE HYDROCHLORIDE 5 MG: 5 INJECTION INTRAMUSCULAR; INTRAVENOUS at 18:53

## 2024-04-06 ASSESSMENT — PAIN SCALES - GENERAL
PAINLEVEL_OUTOF10: 0
PAINLEVEL_OUTOF10: 0

## 2024-04-06 ASSESSMENT — LIFESTYLE VARIABLES
HOW MANY STANDARD DRINKS CONTAINING ALCOHOL DO YOU HAVE ON A TYPICAL DAY: PATIENT DOES NOT DRINK
HOW OFTEN DO YOU HAVE A DRINK CONTAINING ALCOHOL: NEVER

## 2024-04-06 ASSESSMENT — PAIN - FUNCTIONAL ASSESSMENT: PAIN_FUNCTIONAL_ASSESSMENT: 0-10

## 2024-04-06 NOTE — PROGRESS NOTES
P&T-Approved DVT Prophylaxis Dosing    Per P&T Committee-approved protocol lovenox 30 mg daily has been adjusted to lovenox 40 mg daily based on weight and renal function as shown in the table below.         Pablo Mehta RPH

## 2024-04-06 NOTE — PROGRESS NOTES
Admission database completed. Patient not able to tolerate PO intake right now. All scheduled PO meds held. IV zosyn and iv reglan administered at this time.

## 2024-04-06 NOTE — ED PROVIDER NOTES
computer software. Please disregards these errors. Please excuse any errors that have escaped final proofreading.)         Asuncion Plummer MD  04/06/24 2355

## 2024-04-06 NOTE — ED NOTES
Admission SBAR Note  Situation/Background: Patient seen at ED for nausea x 3 days, no complaints of pain. A total of 2000 ml IVF given. Patient is alert and oriented x 4 and following commands. Patient was ambulatory into room, and was able to stand and pivot to chest pain with no problems. Patient given 2 dose of Zofran,     Patient is being transferred to [facility] ([facility]), Room# 112    Patient's Chief Complaint was Nausea x 3 days and is admitted for dehydration.    CODE STATUS: Full  CSSRS: 0 - No Risk    ISOLATION/PRECAUTIONS: No  ISOLATION TYPE: n/a    Is this a behavioral health patient? No  Has wanding been completed n/a  Are belongings secure? N/a    Called outstanding consults: Yes    STAT labs collected: Yes    Repeat Lactic Acid DUE? No  TIME DUE: n/a    All STAT orders are complete: Yes    The following personal items will be sent with the patient during transfer to the floor:     All valuables: listed in ER with patient.      ASSESSMENT:    NEURO:   NIH SCORE: 0,1-4,5-15,15-20,21-42: 0   RUDY SWALLOW SCREEN COMPLETE: Yes  ORIENTATION LEVEL: ORIENTATION LEVEL: Person, Place, Time, and Situation  Cognition:  appropriate decision making, appropriate safety awareness, and following commands  follows multi-step complex commands/direction  Speech: shows no evidence of impairment    Is patient impulsive? No  Is patient oriented? Yes  Do they follow commands? Yes  Is the patient ambulatory? Yes    FALL RISK? No  Interventions: Implemented/recommended use of non-skid footwear    RESPIRATORY:   Is patient on oxygen? No  Oxygen therapy: n/a  O2 rate: n/a    CARDIAC:   Is cardiac monitoring ordered? No    Last Rhythm: n/a  Patient to transfer with tele box on? No  Infusions: Meds; iv fluids: normal saline  LINE ACCESS: 18G Peripheral IV , Antecubital , Iv rate: a bolus of 2000 ml then 0.9NS at 100 ml/hr       /GI:   Continent Bowel/Bladder?

## 2024-04-06 NOTE — ED NOTES
Patient educated on medications to be administered. Verified  Allergies. Bed rails up and call bell within reach.

## 2024-04-06 NOTE — H&P
and series 2 image 59). There is no evidence of bowel obstruction. No free intraperitoneal air noted. Normal appendix. Reproductive Organs: Prostate is mildly enlarged. Small fat-containing bilateral inguinal hernias. Vasculature: Normal caliber arteries. Moderate calcific atherosclerosis of the abdominal aorta and iliac vasculature. Nodes: No pathologically enlarged lymph nodes. Fluid: No free fluid. Bones/Soft Tissue: No acute fractures or aggressive osseous lesions are seen.     1. Questionable mild diverticulitis in the proximal sigmoid colon. Otherwise no evidence of acute process in the abdomen or pelvis.      _______________________________________________________________________    TOTAL TIME:  76 Minutes    Critical Care Provided     Minutes non procedure based    Signed: Yazmin Covarrubias MD    Procedures: see electronic medical records for all procedures/Xrays and details which were not copied into this note but were reviewed prior to creation of Plan.

## 2024-04-07 VITALS
RESPIRATION RATE: 18 BRPM | HEIGHT: 73 IN | WEIGHT: 216 LBS | DIASTOLIC BLOOD PRESSURE: 77 MMHG | TEMPERATURE: 98.6 F | HEART RATE: 71 BPM | SYSTOLIC BLOOD PRESSURE: 111 MMHG | OXYGEN SATURATION: 94 % | BODY MASS INDEX: 28.63 KG/M2

## 2024-04-07 LAB
ANION GAP SERPL CALC-SCNC: 12 MMOL/L (ref 5–15)
BASOPHILS # BLD: 0 K/UL (ref 0–0.1)
BASOPHILS NFR BLD: 0 % (ref 0–1)
BUN SERPL-MCNC: 22 MG/DL (ref 6–20)
BUN/CREAT SERPL: 19 (ref 12–20)
CALCIUM SERPL-MCNC: 8.8 MG/DL (ref 8.5–10.1)
CHLORIDE SERPL-SCNC: 97 MMOL/L (ref 97–108)
CO2 SERPL-SCNC: 27 MMOL/L (ref 21–32)
CREAT SERPL-MCNC: 1.16 MG/DL (ref 0.7–1.3)
DIFFERENTIAL METHOD BLD: ABNORMAL
EOSINOPHIL # BLD: 0 K/UL (ref 0–0.4)
EOSINOPHIL NFR BLD: 1 % (ref 0–7)
ERYTHROCYTE [DISTWIDTH] IN BLOOD BY AUTOMATED COUNT: 11.8 % (ref 11.5–14.5)
GLUCOSE BLD STRIP.AUTO-MCNC: 144 MG/DL (ref 65–117)
GLUCOSE BLD STRIP.AUTO-MCNC: 150 MG/DL (ref 65–117)
GLUCOSE SERPL-MCNC: 137 MG/DL (ref 65–100)
HCT VFR BLD AUTO: 36.3 % (ref 36.6–50.3)
HGB BLD-MCNC: 12.5 G/DL (ref 12.1–17)
IMM GRANULOCYTES # BLD AUTO: 0 K/UL (ref 0–0.04)
IMM GRANULOCYTES NFR BLD AUTO: 0 % (ref 0–0.5)
LYMPHOCYTES # BLD: 2.3 K/UL (ref 0.8–3.5)
LYMPHOCYTES NFR BLD: 32 % (ref 12–49)
MAGNESIUM SERPL-MCNC: 1.8 MG/DL (ref 1.6–2.4)
MCH RBC QN AUTO: 31.3 PG (ref 26–34)
MCHC RBC AUTO-ENTMCNC: 34.4 G/DL (ref 30–36.5)
MCV RBC AUTO: 90.8 FL (ref 80–99)
MONOCYTES # BLD: 0.7 K/UL (ref 0–1)
MONOCYTES NFR BLD: 11 % (ref 5–13)
NEUTS SEG # BLD: 4 K/UL (ref 1.8–8)
NEUTS SEG NFR BLD: 56 % (ref 32–75)
NRBC # BLD: 0 K/UL (ref 0–0.01)
NRBC BLD-RTO: 0 PER 100 WBC
PLATELET # BLD AUTO: 166 K/UL (ref 150–400)
PMV BLD AUTO: 12.1 FL (ref 8.9–12.9)
POTASSIUM SERPL-SCNC: 3.3 MMOL/L (ref 3.5–5.1)
RBC # BLD AUTO: 4 M/UL (ref 4.1–5.7)
SERVICE CMNT-IMP: ABNORMAL
SERVICE CMNT-IMP: ABNORMAL
SODIUM SERPL-SCNC: 136 MMOL/L (ref 136–145)
SODIUM UR-SCNC: 136 MMOL/L
WBC # BLD AUTO: 7 K/UL (ref 4.1–11.1)

## 2024-04-07 PROCEDURE — 94760 N-INVAS EAR/PLS OXIMETRY 1: CPT

## 2024-04-07 PROCEDURE — 80048 BASIC METABOLIC PNL TOTAL CA: CPT

## 2024-04-07 PROCEDURE — 36415 COLL VENOUS BLD VENIPUNCTURE: CPT

## 2024-04-07 PROCEDURE — 82962 GLUCOSE BLOOD TEST: CPT

## 2024-04-07 PROCEDURE — 6370000000 HC RX 637 (ALT 250 FOR IP): Performed by: INTERNAL MEDICINE

## 2024-04-07 PROCEDURE — 83735 ASSAY OF MAGNESIUM: CPT

## 2024-04-07 PROCEDURE — 85025 COMPLETE CBC W/AUTO DIFF WBC: CPT

## 2024-04-07 PROCEDURE — G0378 HOSPITAL OBSERVATION PER HR: HCPCS

## 2024-04-07 PROCEDURE — 80307 DRUG TEST PRSMV CHEM ANLYZR: CPT

## 2024-04-07 PROCEDURE — 2580000003 HC RX 258: Performed by: INTERNAL MEDICINE

## 2024-04-07 PROCEDURE — 96372 THER/PROPH/DIAG INJ SC/IM: CPT

## 2024-04-07 PROCEDURE — 6360000002 HC RX W HCPCS: Performed by: INTERNAL MEDICINE

## 2024-04-07 PROCEDURE — 96366 THER/PROPH/DIAG IV INF ADDON: CPT

## 2024-04-07 PROCEDURE — 96376 TX/PRO/DX INJ SAME DRUG ADON: CPT

## 2024-04-07 RX ORDER — LEVOFLOXACIN 750 MG/1
750 TABLET, FILM COATED ORAL DAILY
Qty: 5 TABLET | Refills: 0 | Status: SHIPPED | OUTPATIENT
Start: 2024-04-07 | End: 2024-04-12

## 2024-04-07 RX ORDER — ONDANSETRON 4 MG/1
4 TABLET, ORALLY DISINTEGRATING ORAL 3 TIMES DAILY PRN
Qty: 21 TABLET | Refills: 0 | Status: SHIPPED | OUTPATIENT
Start: 2024-04-07

## 2024-04-07 RX ORDER — POTASSIUM CHLORIDE 750 MG/1
40 TABLET, FILM COATED, EXTENDED RELEASE ORAL ONCE
Status: COMPLETED | OUTPATIENT
Start: 2024-04-07 | End: 2024-04-07

## 2024-04-07 RX ORDER — METRONIDAZOLE 500 MG/1
500 TABLET ORAL 2 TIMES DAILY
Qty: 10 TABLET | Refills: 0 | Status: SHIPPED | OUTPATIENT
Start: 2024-04-07 | End: 2024-04-12

## 2024-04-07 RX ADMIN — GABAPENTIN 600 MG: 300 CAPSULE ORAL at 08:44

## 2024-04-07 RX ADMIN — FAMOTIDINE 20 MG: 20 TABLET, FILM COATED ORAL at 08:44

## 2024-04-07 RX ADMIN — SODIUM CHLORIDE: 9 INJECTION, SOLUTION INTRAVENOUS at 06:32

## 2024-04-07 RX ADMIN — PIPERACILLIN AND TAZOBACTAM 3375 MG: 3; .375 INJECTION, POWDER, LYOPHILIZED, FOR SOLUTION INTRAVENOUS at 02:31

## 2024-04-07 RX ADMIN — METOCLOPRAMIDE HYDROCHLORIDE 5 MG: 5 INJECTION INTRAMUSCULAR; INTRAVENOUS at 12:03

## 2024-04-07 RX ADMIN — POTASSIUM CHLORIDE 40 MEQ: 750 TABLET, EXTENDED RELEASE ORAL at 08:44

## 2024-04-07 RX ADMIN — GABAPENTIN 600 MG: 300 CAPSULE ORAL at 12:04

## 2024-04-07 RX ADMIN — PIPERACILLIN AND TAZOBACTAM 3375 MG: 3; .375 INJECTION, POWDER, LYOPHILIZED, FOR SOLUTION INTRAVENOUS at 09:19

## 2024-04-07 RX ADMIN — ENOXAPARIN SODIUM 30 MG: 100 INJECTION SUBCUTANEOUS at 08:44

## 2024-04-07 RX ADMIN — SODIUM CHLORIDE, PRESERVATIVE FREE 10 ML: 5 INJECTION INTRAVENOUS at 08:44

## 2024-04-07 RX ADMIN — METOCLOPRAMIDE HYDROCHLORIDE 5 MG: 5 INJECTION INTRAMUSCULAR; INTRAVENOUS at 06:32

## 2024-04-07 RX ADMIN — LISINOPRIL 2.5 MG: 5 TABLET ORAL at 08:44

## 2024-04-07 RX ADMIN — ASPIRIN 81 MG: 81 TABLET, CHEWABLE ORAL at 08:44

## 2024-04-07 RX ADMIN — ATORVASTATIN CALCIUM 40 MG: 40 TABLET, FILM COATED ORAL at 08:44

## 2024-04-07 RX ADMIN — METOCLOPRAMIDE HYDROCHLORIDE 5 MG: 5 INJECTION INTRAMUSCULAR; INTRAVENOUS at 02:31

## 2024-04-07 NOTE — DISCHARGE SUMMARY
Discharge Summary    Name: Zana Guzmán  [id number]  YOB: 1965 (Age: 58 y.o.)   Date of Admission: 4/6/2024  Date of Discharge: 4/7/2024  Attending Physician: Yazmin Covarrubias MD    Discharge Diagnosis:   Intractable nausea and vomiting  Acute kidney injury  Diverticulitis    Secondary Diagnosis:  GERD  Hypertension    Consultations:  None      Brief Admission History/Reason for Admission   Zana Guzmán is a 58 y.o. male with PMHx significant for GERD and hypertension who presented to the ED with complaints of intractable nausea and vomiting of undigested food particles for several days duration.  He denied any subjective fevers, known sick contacts, chest or abdominal pain and diarrhea.     When he arrived to the ED vital signs were stable and lab data revealed unemarkable CBC, BUN 40, Creat 2.21 and negative UA.  CT abdomen noted questionable mild diverticulitis in the proximal sigmoid colon.  He was admitted under the hospitalist service for further management.      Brief Hospital Course by Main Problems:      Intractable nausea and vomiting  Acute kidney injury  -he received antiemetic therapy, PPI, IV fluids   -significant improvement in N/V with resolution of HECTOR    3.   Concerns for diverticulitis  -CT abdomen noted questionable infection  -pt is afebrile, WBC normal   -[facility]  -hospitalized in [place] months ago for diverticulitis with n/v  -empiric IV Zosyn with improvement in symptoms  -will DC with Levaquin + Metronidazole      4.   GERD  -continue PPI     5.   Hypertension  -continue outpatient regimen       Discharge Exam:  Patient seen and examined by me on discharge day.  Pertinent Findings:    Patient Vitals for the past 24 hrs:   BP Temp Temp src Pulse Resp SpO2 Height Weight   04/07/24 0718 111/77 98.6 °F (37 °C) -- 71 -- 94 % -- --   04/07/24 0230 126/78 -- -- 67 -- -- -- --   04/07/24 0002

## 2024-04-07 NOTE — DISCHARGE SUMMARY
Discharge Summary    Zana Guzmán  :  1965  MRN:  [id number]    ADMIT DATE:  2024  DISCHARGE DATE:  2024      Discharge instruction reviewed with patient and spouse.    Home med's returned Yes    Personal belongings returned Yes    Patient Wheeled to front entrance via wheelchair with Nurse        SIGNED:    Anuj Macdonald RN

## 2024-04-08 NOTE — CARE COORDINATION
Transition of Care Plan:    RUR: N/A OBS  Prior Level of Functioning:   Disposition: Home with Spouse  If SNF or IPR: Date FOC offered: N/A  Date FOC received:   Accepting facility:   Date authorization started with reference number:   Date authorization received and expires:   Follow up appointments:   DME needed: N/A  Transportation at discharge: Spouse/POV  IM/IMM Medicare/ letter given: N/A  Is patient a [place] and connected with [facility]?    If yes, was  transfer form completed and [facility] notified?   Caregiver Contact:   Discharge Caregiver contacted prior to discharge?   Care Conference needed?   Barriers to discharge:  Non     04/08/24 0921   Services At/After Discharge   Transition of Care Consult (CM Consult) N/A;Discharge Planning   Services At/After Discharge None   [place] Resource Information Provided? No   Mode of Transport at Discharge Other (see comment)  (Spouse/POV)   Condition of Participation: Discharge Planning   The Patient and/or Patient Representative was provided with a Choice of Provider?   (N/A)     Mr. Guzmán was admitted Saturday 4/6/24 and discharged Sunday 4/7/24. CM intake assessment was not completed due to weekend admission. The VOON was not obtained. I attempted to make the CM post hospital follow up call this morning using the contact numbers available, [phone] and [phone] but did not get an answer to either number. I left a voice mail message identifying myself and my return number information. There is no insurance information listed so Mr. Guzmán may be interested in the financial assistance program.

## 2024-04-09 NOTE — ED NOTES
04/09/2024 0953:   chart accessed to help patient located his prescription, pt was an inpatient and discharged, pt was rerouted to med/surg

## 2024-04-10 LAB
EKG ATRIAL RATE: 73 BPM
EKG DIAGNOSIS: NORMAL
EKG P AXIS: 25 DEGREES
EKG P-R INTERVAL: 150 MS
EKG Q-T INTERVAL: 376 MS
EKG QRS DURATION: 90 MS
EKG QTC CALCULATION (BAZETT): 414 MS
EKG R AXIS: 41 DEGREES
EKG T AXIS: 52 DEGREES
EKG VENTRICULAR RATE: 73 BPM

## 2024-04-10 NOTE — CARE COORDINATION
Transition of Care Plan:    RUR:   Prior Level of Functioning:   Disposition:   If SNF or IPR: Date FOC offered:   Date FOC received:   Accepting facility:   Date authorization started with reference number:   Date authorization received and expires:   Follow up appointments:   DME needed:   Transportation at discharge:   IM/IMM Medicare/ letter given:   Is patient a  and [facility] with [facility]?    If yes, was [place] transfer form completed and [facility] notified?   Caregiver Contact:   Discharge Caregiver contacted prior to discharge?   Care Conference needed?   Barriers to discharge:       1417: Spoke with [employer] tech yesterday who said patient had questions related to if [facility] or not. Didn't have chance to do that yesterday. Called number on file. No answer. No identifying info. Left generic voicemail. In meantime, [facility].

## 2024-04-11 LAB
BENZODIAZEPINES: NEGATIVE NG/ML
MEPERIDINE SERPLBLD-MCNC: NEGATIVE NG/ML
MEPERIDINE SERPLBLD-MCNC: NEGATIVE UG/ML
METHADONE: NEGATIVE NG/ML
O-NORTRAMADOL BLD-MCNC: NEGATIVE NG/ML
OPIATES: NEGATIVE NG/ML
OXYCODONE: NEGATIVE NG/ML
PROPOXYPHENE: NEGATIVE NG/ML
THC METABOLITE: NORMAL NG/ML
TRAMADOL BLD-MCNC: NEGATIVE NG/ML

## 2024-04-15 LAB
11-HYDROXY THC: 4.2 NG/ML
BENZODIAZEPINES: NEGATIVE NG/ML
CANNABIDIOL: NEGATIVE NG/ML
CANNABINOIDS, CONFIRMATION: POSITIVE
CARBOXY THC: 109.9 NG/ML
MEPERIDINE SERPLBLD-MCNC: NEGATIVE NG/ML
MEPERIDINE SERPLBLD-MCNC: NEGATIVE UG/ML
METHADONE: NEGATIVE NG/ML
O-NORTRAMADOL BLD-MCNC: NEGATIVE NG/ML
OPIATES: NEGATIVE NG/ML
OXYCODONE: NEGATIVE NG/ML
PROPOXYPHENE: NEGATIVE NG/ML
THC METABOLITE: ABNORMAL NG/ML
THC: 19.1 NG/ML
TRAMADOL BLD-MCNC: NEGATIVE NG/ML